# Patient Record
Sex: FEMALE | Race: WHITE | NOT HISPANIC OR LATINO | Employment: OTHER | URBAN - NONMETROPOLITAN AREA
[De-identification: names, ages, dates, MRNs, and addresses within clinical notes are randomized per-mention and may not be internally consistent; named-entity substitution may affect disease eponyms.]

---

## 2017-04-26 ENCOUNTER — APPOINTMENT (OUTPATIENT)
Dept: LAB | Facility: HOSPITAL | Age: 42
End: 2017-04-26

## 2017-04-26 ENCOUNTER — TRANSCRIBE ORDERS (OUTPATIENT)
Dept: ADMINISTRATIVE | Facility: HOSPITAL | Age: 42
End: 2017-04-26

## 2017-04-26 DIAGNOSIS — Z32.00 PREGNANCY EXAMINATION OR TEST, PREGNANCY UNCONFIRMED: Primary | ICD-10-CM

## 2017-04-26 LAB — HCG INTACT+B SERPL-ACNC: <2.39 MIU/ML

## 2017-04-26 PROCEDURE — 84702 CHORIONIC GONADOTROPIN TEST: CPT | Performed by: SPECIALIST

## 2017-04-26 PROCEDURE — 36415 COLL VENOUS BLD VENIPUNCTURE: CPT | Performed by: SPECIALIST

## 2017-07-15 ENCOUNTER — APPOINTMENT (OUTPATIENT)
Dept: LAB | Facility: HOSPITAL | Age: 42
End: 2017-07-15

## 2017-07-15 ENCOUNTER — TRANSCRIBE ORDERS (OUTPATIENT)
Dept: ADMINISTRATIVE | Facility: HOSPITAL | Age: 42
End: 2017-07-15

## 2017-07-15 DIAGNOSIS — Z32.00 ENCOUNTER FOR PREGNANCY TEST, RESULT UNKNOWN: Primary | ICD-10-CM

## 2017-07-15 LAB — HCG INTACT+B SERPL-ACNC: NORMAL MIU/ML

## 2017-07-15 PROCEDURE — 36415 COLL VENOUS BLD VENIPUNCTURE: CPT | Performed by: SPECIALIST

## 2017-07-15 PROCEDURE — 84702 CHORIONIC GONADOTROPIN TEST: CPT | Performed by: SPECIALIST

## 2017-08-15 ENCOUNTER — TRANSCRIBE ORDERS (OUTPATIENT)
Dept: OBSTETRICS AND GYNECOLOGY | Facility: HOSPITAL | Age: 42
End: 2017-08-15

## 2017-08-15 DIAGNOSIS — Z36.82 ENCOUNTER FOR NUCHAL TRANSLUCENCY TESTING: ICD-10-CM

## 2017-08-15 DIAGNOSIS — O09.522 AMA (ADVANCED MATERNAL AGE) MULTIGRAVIDA 35+, SECOND TRIMESTER: Primary | ICD-10-CM

## 2017-08-15 LAB
EXTERNAL ABO GROUPING: NORMAL
EXTERNAL ANTIBODY SCREEN: NEGATIVE
EXTERNAL HEPATITIS B SURFACE ANTIGEN: NEGATIVE
EXTERNAL RH FACTOR: NEGATIVE
EXTERNAL RUBELLA QUALITATIVE: NORMAL
HIV1 P24 AG SERPL QL IA: NORMAL

## 2017-08-29 ENCOUNTER — OFFICE VISIT (OUTPATIENT)
Dept: OBSTETRICS AND GYNECOLOGY | Facility: HOSPITAL | Age: 42
End: 2017-08-29

## 2017-08-29 ENCOUNTER — HOSPITAL ENCOUNTER (OUTPATIENT)
Dept: WOMENS IMAGING | Facility: HOSPITAL | Age: 42
Discharge: HOME | End: 2017-08-29
Attending: OBSTETRICS & GYNECOLOGY | Admitting: OBSTETRICS & GYNECOLOGY

## 2017-08-29 VITALS
HEIGHT: 63 IN | SYSTOLIC BLOOD PRESSURE: 110 MMHG | DIASTOLIC BLOOD PRESSURE: 78 MMHG | WEIGHT: 156.2 LBS | BODY MASS INDEX: 27.68 KG/M2

## 2017-08-29 DIAGNOSIS — Z36.82 ENCOUNTER FOR NUCHAL TRANSLUCENCY TESTING: ICD-10-CM

## 2017-08-29 DIAGNOSIS — O26.21 RECURRENT PREGNANCY LOSS IN PREGNANT PATIENT IN FIRST TRIMESTER, ANTEPARTUM: ICD-10-CM

## 2017-08-29 DIAGNOSIS — O09.511 AMA (ADVANCED MATERNAL AGE) PRIMIGRAVIDA 35+, FIRST TRIMESTER: Primary | ICD-10-CM

## 2017-08-29 DIAGNOSIS — O09.522 AMA (ADVANCED MATERNAL AGE) MULTIGRAVIDA 35+, SECOND TRIMESTER: ICD-10-CM

## 2017-08-29 PROBLEM — O09.519 AMA (ADVANCED MATERNAL AGE) PRIMIGRAVIDA 35+: Status: ACTIVE | Noted: 2017-08-29

## 2017-08-29 PROCEDURE — 99241 PR OFFICE CONSULTATION NEW/ESTAB PATIENT 15 MIN: CPT | Performed by: OBSTETRICS & GYNECOLOGY

## 2017-08-29 PROCEDURE — 76801 OB US < 14 WKS SINGLE FETUS: CPT | Performed by: OBSTETRICS & GYNECOLOGY

## 2017-08-29 PROCEDURE — 76813 OB US NUCHAL MEAS 1 GEST: CPT

## 2017-08-29 PROCEDURE — 76801 OB US < 14 WKS SINGLE FETUS: CPT

## 2017-08-29 PROCEDURE — 76813 OB US NUCHAL MEAS 1 GEST: CPT | Performed by: OBSTETRICS & GYNECOLOGY

## 2017-08-29 RX ORDER — PRENATAL WITH FERROUS FUM AND FOLIC ACID 3080; 920; 120; 400; 22; 1.84; 3; 20; 10; 1; 12; 200; 27; 25; 2 [IU]/1; [IU]/1; MG/1; [IU]/1; MG/1; MG/1; MG/1; MG/1; MG/1; MG/1; UG/1; MG/1; MG/1; MG/1; MG/1
1 TABLET ORAL DAILY
COMMUNITY
End: 2022-11-22

## 2017-08-29 NOTE — PROGRESS NOTES
Denies problems. Reports 2 spontaneous conceptions that resulted in miscarriage then saw Dr. Cardoso for unexplained infertility. Did multiple IUI cycles and one IVF cycle that did not result in conception. Current pregnancy was spontaneous conception; used Progesterone through 1st trimester. States Dr. Nunez has mentioned different options for genetic screening/testing; patient is interested but needs more information before making decision. To see Dr. Nunez today.

## 2017-08-29 NOTE — PROGRESS NOTES
Documentation of the ultasound findings, images, and interpretations as well as consultation note will be available in the patient's Viewpoint report located in the Chart Review Imaging tab in [facility].

## 2017-12-19 LAB — EXTERNAL GLUCOSE TOLERANCE TEST FASTING: 100 MG/DL

## 2018-02-13 ENCOUNTER — TRANSCRIBE ORDERS (OUTPATIENT)
Dept: LAB | Facility: HOSPITAL | Age: 43
End: 2018-02-13

## 2018-02-13 ENCOUNTER — LAB (OUTPATIENT)
Dept: LAB | Facility: HOSPITAL | Age: 43
End: 2018-02-13

## 2018-02-13 DIAGNOSIS — Z34.83 PRENATAL CARE, SUBSEQUENT PREGNANCY, THIRD TRIMESTER: Primary | ICD-10-CM

## 2018-02-13 DIAGNOSIS — Z34.83 PRENATAL CARE, SUBSEQUENT PREGNANCY, THIRD TRIMESTER: ICD-10-CM

## 2018-02-13 LAB — EXTERNAL GROUP B STREP ANTIGEN: NORMAL

## 2018-02-13 PROCEDURE — 87081 CULTURE SCREEN ONLY: CPT

## 2018-02-16 LAB — BACTERIA SPEC AEROBE CULT: NORMAL

## 2018-03-02 ENCOUNTER — PREP FOR SURGERY (OUTPATIENT)
Dept: OTHER | Facility: HOSPITAL | Age: 43
End: 2018-03-02

## 2018-03-02 RX ORDER — MISOPROSTOL 100 MCG
25 TABLET ORAL ONCE
Status: CANCELLED | OUTPATIENT
Start: 2018-03-04

## 2018-03-02 RX ORDER — METHYLERGONOVINE MALEATE 0.2 MG/ML
200 INJECTION INTRAVENOUS ONCE AS NEEDED
Status: CANCELLED | OUTPATIENT
Start: 2018-03-02

## 2018-03-02 RX ORDER — OXYTOCIN/RINGER'S LACTATE 20/1000 ML
999 PLASTIC BAG, INJECTION (ML) INTRAVENOUS ONCE
Status: CANCELLED | OUTPATIENT
Start: 2018-03-02 | End: 2018-03-02

## 2018-03-02 RX ORDER — SODIUM CHLORIDE 0.9 % (FLUSH) 0.9 %
1-10 SYRINGE (ML) INJECTION AS NEEDED
Status: CANCELLED | OUTPATIENT
Start: 2018-03-02

## 2018-03-02 RX ORDER — LIDOCAINE HYDROCHLORIDE 10 MG/ML
5 INJECTION, SOLUTION EPIDURAL; INFILTRATION; INTRACAUDAL; PERINEURAL AS NEEDED
Status: CANCELLED | OUTPATIENT
Start: 2018-03-02

## 2018-03-02 RX ORDER — MISOPROSTOL 200 UG/1
800 TABLET ORAL AS NEEDED
Status: CANCELLED | OUTPATIENT
Start: 2018-03-02

## 2018-03-02 RX ORDER — OXYTOCIN/RINGER'S LACTATE 20/1000 ML
125 PLASTIC BAG, INJECTION (ML) INTRAVENOUS CONTINUOUS PRN
Status: CANCELLED | OUTPATIENT
Start: 2018-03-02

## 2018-03-02 RX ORDER — OXYTOCIN-SODIUM CHLORIDE 0.9% IV SOLN 30 UNIT/500ML 30-0.9/5 UT/ML-%
2-24 SOLUTION INTRAVENOUS
Status: CANCELLED | OUTPATIENT
Start: 2018-03-04

## 2018-03-02 RX ORDER — CARBOPROST TROMETHAMINE 250 UG/ML
250 INJECTION, SOLUTION INTRAMUSCULAR AS NEEDED
Status: CANCELLED | OUTPATIENT
Start: 2018-03-02

## 2018-03-02 RX ORDER — SODIUM CHLORIDE, SODIUM LACTATE, POTASSIUM CHLORIDE, CALCIUM CHLORIDE 600; 310; 30; 20 MG/100ML; MG/100ML; MG/100ML; MG/100ML
125 INJECTION, SOLUTION INTRAVENOUS CONTINUOUS
Status: CANCELLED | OUTPATIENT
Start: 2018-03-02

## 2018-03-02 RX ORDER — MAGNESIUM CARB/ALUMINUM HYDROX 105-160MG
30 TABLET,CHEWABLE ORAL ONCE
Status: CANCELLED | OUTPATIENT
Start: 2018-03-02 | End: 2018-03-02

## 2018-03-04 ENCOUNTER — HOSPITAL ENCOUNTER (INPATIENT)
Dept: LABOR AND DELIVERY | Facility: HOSPITAL | Age: 43
LOS: 3 days | Discharge: HOME | End: 2018-03-07
Attending: OBSTETRICS & GYNECOLOGY | Admitting: OBSTETRICS & GYNECOLOGY

## 2018-03-04 PROBLEM — Z34.90 PREGNANCY: Status: ACTIVE | Noted: 2018-03-04

## 2018-03-04 LAB
ABO GROUP BLD: NORMAL
AMPHET+METHAMPHET UR QL: NEGATIVE
AMPHETAMINES UR QL: NEGATIVE
ANTI-D, PASSIVE: NORMAL
BARBITURATES UR QL SCN: NEGATIVE
BASOPHILS # BLD AUTO: 0.02 10*3/MM3 (ref 0–0.2)
BASOPHILS NFR BLD AUTO: 0.2 % (ref 0–1)
BENZODIAZ UR QL SCN: NEGATIVE
BLD GP AB SCN SERPL QL: POSITIVE
BUPRENORPHINE SERPL-MCNC: NEGATIVE NG/ML
CANNABINOIDS SERPL QL: NEGATIVE
COCAINE UR QL: NEGATIVE
DEPRECATED RDW RBC AUTO: 43.6 FL (ref 37–54)
EOSINOPHIL # BLD AUTO: 0.13 10*3/MM3 (ref 0–0.3)
EOSINOPHIL NFR BLD AUTO: 1.1 % (ref 0–3)
ERYTHROCYTE [DISTWIDTH] IN BLOOD BY AUTOMATED COUNT: 13.2 % (ref 11.3–14.5)
HCT VFR BLD AUTO: 37.5 % (ref 34.5–44)
HGB BLD-MCNC: 12.8 G/DL (ref 11.5–15.5)
IMM GRANULOCYTES # BLD: 0.06 10*3/MM3 (ref 0–0.03)
IMM GRANULOCYTES NFR BLD: 0.5 % (ref 0–0.6)
LYMPHOCYTES # BLD AUTO: 2.9 10*3/MM3 (ref 0.6–4.8)
LYMPHOCYTES NFR BLD AUTO: 24.9 % (ref 24–44)
MCH RBC QN AUTO: 31.2 PG (ref 27–31)
MCHC RBC AUTO-ENTMCNC: 34.1 G/DL (ref 32–36)
MCV RBC AUTO: 91.5 FL (ref 80–99)
METHADONE UR QL SCN: NEGATIVE
MONOCYTES # BLD AUTO: 0.73 10*3/MM3 (ref 0–1)
MONOCYTES NFR BLD AUTO: 6.3 % (ref 0–12)
NEUTROPHILS # BLD AUTO: 7.79 10*3/MM3 (ref 1.5–8.3)
NEUTROPHILS NFR BLD AUTO: 67 % (ref 41–71)
OPIATES UR QL: NEGATIVE
OXYCODONE UR QL SCN: NEGATIVE
PCP UR QL SCN: NEGATIVE
PLATELET # BLD AUTO: 236 10*3/MM3 (ref 150–450)
PMV BLD AUTO: 10.2 FL (ref 6–12)
PROPOXYPH UR QL: NEGATIVE
RBC # BLD AUTO: 4.1 10*6/MM3 (ref 3.89–5.14)
RH BLD: NEGATIVE
TRICYCLICS UR QL SCN: NEGATIVE
WBC NRBC COR # BLD: 11.63 10*3/MM3 (ref 3.5–10.8)

## 2018-03-04 PROCEDURE — 59200 INSERT CERVICAL DILATOR: CPT | Performed by: OBSTETRICS & GYNECOLOGY

## 2018-03-04 PROCEDURE — 85025 COMPLETE CBC W/AUTO DIFF WBC: CPT | Performed by: OBSTETRICS & GYNECOLOGY

## 2018-03-04 PROCEDURE — 25010000002 BUTORPHANOL PER 1 MG: Performed by: OBSTETRICS & GYNECOLOGY

## 2018-03-04 PROCEDURE — 86900 BLOOD TYPING SEROLOGIC ABO: CPT | Performed by: OBSTETRICS & GYNECOLOGY

## 2018-03-04 PROCEDURE — 86850 RBC ANTIBODY SCREEN: CPT | Performed by: OBSTETRICS & GYNECOLOGY

## 2018-03-04 PROCEDURE — C1755 CATHETER, INTRASPINAL: HCPCS

## 2018-03-04 PROCEDURE — 86870 RBC ANTIBODY IDENTIFICATION: CPT | Performed by: OBSTETRICS & GYNECOLOGY

## 2018-03-04 PROCEDURE — 59025 FETAL NON-STRESS TEST: CPT

## 2018-03-04 PROCEDURE — 80306 DRUG TEST PRSMV INSTRMNT: CPT | Performed by: OBSTETRICS & GYNECOLOGY

## 2018-03-04 PROCEDURE — 86901 BLOOD TYPING SEROLOGIC RH(D): CPT | Performed by: OBSTETRICS & GYNECOLOGY

## 2018-03-04 RX ORDER — MISOPROSTOL 100 MCG
25 TABLET ORAL ONCE
Status: COMPLETED | OUTPATIENT
Start: 2018-03-04 | End: 2018-03-04

## 2018-03-04 RX ORDER — DOCUSATE SODIUM 100 MG/1
100 CAPSULE, LIQUID FILLED ORAL DAILY
COMMUNITY

## 2018-03-04 RX ORDER — MAGNESIUM CARB/ALUMINUM HYDROX 105-160MG
30 TABLET,CHEWABLE ORAL ONCE
Status: DISCONTINUED | OUTPATIENT
Start: 2018-03-04 | End: 2018-03-05 | Stop reason: HOSPADM

## 2018-03-04 RX ORDER — SODIUM CHLORIDE, SODIUM LACTATE, POTASSIUM CHLORIDE, CALCIUM CHLORIDE 600; 310; 30; 20 MG/100ML; MG/100ML; MG/100ML; MG/100ML
125 INJECTION, SOLUTION INTRAVENOUS CONTINUOUS
Status: DISCONTINUED | OUTPATIENT
Start: 2018-03-04 | End: 2018-03-07 | Stop reason: HOSPADM

## 2018-03-04 RX ORDER — SODIUM CHLORIDE 0.9 % (FLUSH) 0.9 %
1-10 SYRINGE (ML) INJECTION AS NEEDED
Status: DISCONTINUED | OUTPATIENT
Start: 2018-03-04 | End: 2018-03-05 | Stop reason: HOSPADM

## 2018-03-04 RX ORDER — LIDOCAINE HYDROCHLORIDE 10 MG/ML
5 INJECTION, SOLUTION EPIDURAL; INFILTRATION; INTRACAUDAL; PERINEURAL AS NEEDED
Status: DISCONTINUED | OUTPATIENT
Start: 2018-03-04 | End: 2018-03-05 | Stop reason: HOSPADM

## 2018-03-04 RX ORDER — OXYTOCIN-SODIUM CHLORIDE 0.9% IV SOLN 30 UNIT/500ML 30-0.9/5 UT/ML-%
2-24 SOLUTION INTRAVENOUS
Status: DISCONTINUED | OUTPATIENT
Start: 2018-03-04 | End: 2018-03-05 | Stop reason: HOSPADM

## 2018-03-04 RX ADMIN — SODIUM CHLORIDE, POTASSIUM CHLORIDE, SODIUM LACTATE AND CALCIUM CHLORIDE 125 ML/HR: 600; 310; 30; 20 INJECTION, SOLUTION INTRAVENOUS at 19:00

## 2018-03-04 RX ADMIN — MISOPROSTOL 25 MCG: 100 TABLET ORAL at 18:45

## 2018-03-04 RX ADMIN — BUTORPHANOL TARTRATE 2 MG: 2 INJECTION, SOLUTION INTRAMUSCULAR; INTRAVENOUS at 23:57

## 2018-03-05 ENCOUNTER — ANESTHESIA (OUTPATIENT)
Dept: LABOR AND DELIVERY | Facility: HOSPITAL | Age: 43
End: 2018-03-05

## 2018-03-05 ENCOUNTER — ANESTHESIA EVENT (OUTPATIENT)
Dept: LABOR AND DELIVERY | Facility: HOSPITAL | Age: 43
End: 2018-03-05

## 2018-03-05 PROBLEM — Z34.90 PREGNANCY: Status: RESOLVED | Noted: 2018-03-04 | Resolved: 2018-03-05

## 2018-03-05 LAB
ABO GROUP BLD: NORMAL
FETAL BLEED: NEGATIVE
GLUCOSE BLDC GLUCOMTR-MCNC: 71 MG/DL (ref 70–130)
NUMBER OF DOSES: NORMAL
RH BLD: NEGATIVE

## 2018-03-05 PROCEDURE — 25010000002 ONDANSETRON PER 1 MG: Performed by: ANESTHESIOLOGY

## 2018-03-05 PROCEDURE — 86900 BLOOD TYPING SEROLOGIC ABO: CPT | Performed by: OBSTETRICS & GYNECOLOGY

## 2018-03-05 PROCEDURE — C1755 CATHETER, INTRASPINAL: HCPCS | Performed by: ANESTHESIOLOGY

## 2018-03-05 PROCEDURE — 82962 GLUCOSE BLOOD TEST: CPT

## 2018-03-05 PROCEDURE — 86901 BLOOD TYPING SEROLOGIC RH(D): CPT | Performed by: OBSTETRICS & GYNECOLOGY

## 2018-03-05 PROCEDURE — 85461 HEMOGLOBIN FETAL: CPT | Performed by: OBSTETRICS & GYNECOLOGY

## 2018-03-05 PROCEDURE — 59025 FETAL NON-STRESS TEST: CPT

## 2018-03-05 PROCEDURE — 25010000002 FENTANYL CITRATE (PF) 100 MCG/2ML SOLUTION: Performed by: ANESTHESIOLOGY

## 2018-03-05 PROCEDURE — 25010000002 ROPIVACAINE PER 1 MG: Performed by: ANESTHESIOLOGY

## 2018-03-05 PROCEDURE — 25010000002 RHO D IMMUNE GLOBULIN 1500 UNIT/2ML SOLUTION PREFILLED SYRINGE: Performed by: OBSTETRICS & GYNECOLOGY

## 2018-03-05 RX ORDER — LANOLIN 100 %
OINTMENT (GRAM) TOPICAL
Status: DISCONTINUED | OUTPATIENT
Start: 2018-03-05 | End: 2018-03-07 | Stop reason: HOSPADM

## 2018-03-05 RX ORDER — METOCLOPRAMIDE HYDROCHLORIDE 5 MG/ML
10 INJECTION INTRAMUSCULAR; INTRAVENOUS ONCE AS NEEDED
Status: DISCONTINUED | OUTPATIENT
Start: 2018-03-05 | End: 2018-03-05 | Stop reason: HOSPADM

## 2018-03-05 RX ORDER — DIPHENHYDRAMINE HYDROCHLORIDE 50 MG/ML
12.5 INJECTION INTRAMUSCULAR; INTRAVENOUS EVERY 8 HOURS PRN
Status: DISCONTINUED | OUTPATIENT
Start: 2018-03-05 | End: 2018-03-05 | Stop reason: HOSPADM

## 2018-03-05 RX ORDER — PROMETHAZINE HYDROCHLORIDE 25 MG/ML
12.5 INJECTION, SOLUTION INTRAMUSCULAR; INTRAVENOUS EVERY 6 HOURS PRN
Status: DISCONTINUED | OUTPATIENT
Start: 2018-03-05 | End: 2018-03-07 | Stop reason: HOSPADM

## 2018-03-05 RX ORDER — LIDOCAINE HYDROCHLORIDE AND EPINEPHRINE 15; 5 MG/ML; UG/ML
INJECTION, SOLUTION EPIDURAL AS NEEDED
Status: DISCONTINUED | OUTPATIENT
Start: 2018-03-05 | End: 2018-03-05 | Stop reason: SURG

## 2018-03-05 RX ORDER — OXYTOCIN/RINGER'S LACTATE 20/1000 ML
999 PLASTIC BAG, INJECTION (ML) INTRAVENOUS ONCE
Status: COMPLETED | OUTPATIENT
Start: 2018-03-05 | End: 2018-03-05

## 2018-03-05 RX ORDER — DOCUSATE SODIUM 100 MG/1
100 CAPSULE, LIQUID FILLED ORAL 2 TIMES DAILY
Status: DISCONTINUED | OUTPATIENT
Start: 2018-03-05 | End: 2018-03-07 | Stop reason: HOSPADM

## 2018-03-05 RX ORDER — PROMETHAZINE HYDROCHLORIDE 12.5 MG/1
12.5 SUPPOSITORY RECTAL EVERY 6 HOURS PRN
Status: DISCONTINUED | OUTPATIENT
Start: 2018-03-05 | End: 2018-03-07 | Stop reason: HOSPADM

## 2018-03-05 RX ORDER — CARBOPROST TROMETHAMINE 250 UG/ML
250 INJECTION, SOLUTION INTRAMUSCULAR AS NEEDED
Status: DISCONTINUED | OUTPATIENT
Start: 2018-03-05 | End: 2018-03-05 | Stop reason: HOSPADM

## 2018-03-05 RX ORDER — ZOLPIDEM TARTRATE 5 MG/1
5 TABLET ORAL NIGHTLY PRN
Status: DISCONTINUED | OUTPATIENT
Start: 2018-03-05 | End: 2018-03-07 | Stop reason: HOSPADM

## 2018-03-05 RX ORDER — FENTANYL CITRATE 50 UG/ML
INJECTION, SOLUTION INTRAMUSCULAR; INTRAVENOUS AS NEEDED
Status: DISCONTINUED | OUTPATIENT
Start: 2018-03-05 | End: 2018-03-05 | Stop reason: SURG

## 2018-03-05 RX ORDER — HYDROCODONE BITARTRATE AND ACETAMINOPHEN 5; 325 MG/1; MG/1
1 TABLET ORAL EVERY 4 HOURS PRN
Status: DISCONTINUED | OUTPATIENT
Start: 2018-03-05 | End: 2018-03-07 | Stop reason: HOSPADM

## 2018-03-05 RX ORDER — METHYLERGONOVINE MALEATE 0.2 MG/ML
200 INJECTION INTRAVENOUS ONCE AS NEEDED
Status: DISCONTINUED | OUTPATIENT
Start: 2018-03-05 | End: 2018-03-05 | Stop reason: HOSPADM

## 2018-03-05 RX ORDER — OXYCODONE HYDROCHLORIDE AND ACETAMINOPHEN 5; 325 MG/1; MG/1
1 TABLET ORAL EVERY 4 HOURS PRN
Status: DISCONTINUED | OUTPATIENT
Start: 2018-03-05 | End: 2018-03-07 | Stop reason: HOSPADM

## 2018-03-05 RX ORDER — IBUPROFEN 600 MG/1
600 TABLET ORAL EVERY 6 HOURS PRN
Status: DISCONTINUED | OUTPATIENT
Start: 2018-03-05 | End: 2018-03-05 | Stop reason: SDUPTHER

## 2018-03-05 RX ORDER — LANOLIN 100 %
OINTMENT (GRAM) TOPICAL AS NEEDED
Status: DISCONTINUED | OUTPATIENT
Start: 2018-03-05 | End: 2018-03-07 | Stop reason: HOSPADM

## 2018-03-05 RX ORDER — ROPIVACAINE HYDROCHLORIDE 2 MG/ML
15 INJECTION, SOLUTION EPIDURAL; INFILTRATION; PERINEURAL CONTINUOUS
Status: DISCONTINUED | OUTPATIENT
Start: 2018-03-05 | End: 2018-03-07 | Stop reason: HOSPADM

## 2018-03-05 RX ORDER — OXYTOCIN/RINGER'S LACTATE 20/1000 ML
125 PLASTIC BAG, INJECTION (ML) INTRAVENOUS CONTINUOUS PRN
Status: DISCONTINUED | OUTPATIENT
Start: 2018-03-05 | End: 2018-03-05 | Stop reason: HOSPADM

## 2018-03-05 RX ORDER — IBUPROFEN 600 MG/1
600 TABLET ORAL EVERY 6 HOURS PRN
Status: DISCONTINUED | OUTPATIENT
Start: 2018-03-05 | End: 2018-03-07 | Stop reason: HOSPADM

## 2018-03-05 RX ORDER — MISOPROSTOL 200 UG/1
800 TABLET ORAL AS NEEDED
Status: DISCONTINUED | OUTPATIENT
Start: 2018-03-05 | End: 2018-03-05 | Stop reason: HOSPADM

## 2018-03-05 RX ORDER — TRISODIUM CITRATE DIHYDRATE AND CITRIC ACID MONOHYDRATE 500; 334 MG/5ML; MG/5ML
30 SOLUTION ORAL ONCE
Status: DISCONTINUED | OUTPATIENT
Start: 2018-03-05 | End: 2018-03-05 | Stop reason: HOSPADM

## 2018-03-05 RX ORDER — ONDANSETRON 2 MG/ML
4 INJECTION INTRAMUSCULAR; INTRAVENOUS ONCE AS NEEDED
Status: COMPLETED | OUTPATIENT
Start: 2018-03-05 | End: 2018-03-05

## 2018-03-05 RX ORDER — EPHEDRINE SULFATE 50 MG/ML
10 INJECTION, SOLUTION INTRAVENOUS
Status: DISCONTINUED | OUTPATIENT
Start: 2018-03-05 | End: 2018-03-05 | Stop reason: HOSPADM

## 2018-03-05 RX ORDER — PROMETHAZINE HYDROCHLORIDE 25 MG/1
25 TABLET ORAL EVERY 6 HOURS PRN
Status: DISCONTINUED | OUTPATIENT
Start: 2018-03-05 | End: 2018-03-07 | Stop reason: HOSPADM

## 2018-03-05 RX ORDER — BISACODYL 10 MG
10 SUPPOSITORY, RECTAL RECTAL DAILY PRN
Status: DISCONTINUED | OUTPATIENT
Start: 2018-03-06 | End: 2018-03-07 | Stop reason: HOSPADM

## 2018-03-05 RX ORDER — DOCUSATE SODIUM 100 MG/1
100 CAPSULE, LIQUID FILLED ORAL 2 TIMES DAILY
Status: DISCONTINUED | OUTPATIENT
Start: 2018-03-05 | End: 2018-03-05 | Stop reason: SDUPTHER

## 2018-03-05 RX ORDER — SODIUM CHLORIDE 0.9 % (FLUSH) 0.9 %
1-10 SYRINGE (ML) INJECTION AS NEEDED
Status: DISCONTINUED | OUTPATIENT
Start: 2018-03-05 | End: 2018-03-07 | Stop reason: HOSPADM

## 2018-03-05 RX ORDER — LIDOCAINE HYDROCHLORIDE AND EPINEPHRINE 20; 5 MG/ML; UG/ML
INJECTION, SOLUTION EPIDURAL; INFILTRATION; INTRACAUDAL; PERINEURAL AS NEEDED
Status: DISCONTINUED | OUTPATIENT
Start: 2018-03-05 | End: 2018-03-05 | Stop reason: SURG

## 2018-03-05 RX ADMIN — SODIUM CHLORIDE, POTASSIUM CHLORIDE, SODIUM LACTATE AND CALCIUM CHLORIDE 125 ML/HR: 600; 310; 30; 20 INJECTION, SOLUTION INTRAVENOUS at 06:55

## 2018-03-05 RX ADMIN — SODIUM CHLORIDE, POTASSIUM CHLORIDE, SODIUM LACTATE AND CALCIUM CHLORIDE 125 ML/HR: 600; 310; 30; 20 INJECTION, SOLUTION INTRAVENOUS at 01:09

## 2018-03-05 RX ADMIN — SODIUM CHLORIDE, POTASSIUM CHLORIDE, SODIUM LACTATE AND CALCIUM CHLORIDE 1500 ML: 600; 310; 30; 20 INJECTION, SOLUTION INTRAVENOUS at 04:09

## 2018-03-05 RX ADMIN — Medication 125 ML/HR: at 12:59

## 2018-03-05 RX ADMIN — ROPIVACAINE HYDROCHLORIDE 15 ML/HR: 2 INJECTION, SOLUTION EPIDURAL; INFILTRATION at 04:33

## 2018-03-05 RX ADMIN — Medication 999 ML/HR: at 12:49

## 2018-03-05 RX ADMIN — OXYTOCIN 2 MILLI-UNITS/MIN: 10 INJECTION INTRAVENOUS at 01:09

## 2018-03-05 RX ADMIN — LIDOCAINE HYDROCHLORIDE,EPINEPHRINE BITARTRATE 5 ML: 20; .005 INJECTION, SOLUTION EPIDURAL; INFILTRATION; INTRACAUDAL; PERINEURAL at 12:29

## 2018-03-05 RX ADMIN — OXYCODONE AND ACETAMINOPHEN 1 TABLET: 5; 325 TABLET ORAL at 15:01

## 2018-03-05 RX ADMIN — IBUPROFEN 600 MG: 600 TABLET ORAL at 15:02

## 2018-03-05 RX ADMIN — ONDANSETRON HYDROCHLORIDE 4 MG: 2 INJECTION, SOLUTION INTRAMUSCULAR; INTRAVENOUS at 07:59

## 2018-03-05 RX ADMIN — LIDOCAINE HYDROCHLORIDE AND EPINEPHRINE 2 ML: 15; 5 INJECTION, SOLUTION EPIDURAL at 04:27

## 2018-03-05 RX ADMIN — SODIUM CHLORIDE, POTASSIUM CHLORIDE, SODIUM LACTATE AND CALCIUM CHLORIDE 125 ML/HR: 600; 310; 30; 20 INJECTION, SOLUTION INTRAVENOUS at 04:15

## 2018-03-05 RX ADMIN — FENTANYL CITRATE 100 MCG: 50 INJECTION, SOLUTION INTRAMUSCULAR; INTRAVENOUS at 04:32

## 2018-03-05 RX ADMIN — ROPIVACAINE HYDROCHLORIDE 11 ML: 5 INJECTION, SOLUTION EPIDURAL; INFILTRATION; PERINEURAL at 04:29

## 2018-03-05 RX ADMIN — HUMAN RHO(D) IMMUNE GLOBULIN 1500 UNITS: 1500 SOLUTION INTRAMUSCULAR; INTRAVENOUS at 21:28

## 2018-03-05 RX ADMIN — LIDOCAINE HYDROCHLORIDE AND EPINEPHRINE 3 ML: 15; 5 INJECTION, SOLUTION EPIDURAL at 04:25

## 2018-03-05 RX ADMIN — WITCH HAZEL 1 PAD: 500 SOLUTION RECTAL; TOPICAL at 15:01

## 2018-03-05 RX ADMIN — EPHEDRINE SULFATE 10 MG: 50 INJECTION, SOLUTION INTRAVENOUS at 07:38

## 2018-03-05 RX ADMIN — SODIUM CHLORIDE, POTASSIUM CHLORIDE, SODIUM LACTATE AND CALCIUM CHLORIDE 300 ML: 600; 310; 30; 20 INJECTION, SOLUTION INTRAVENOUS at 07:30

## 2018-03-05 NOTE — PLAN OF CARE
Problem: Patient Care Overview (Adult)  Goal: Plan of Care Review  Outcome: Ongoing (interventions implemented as appropriate)      Problem: Breastfeeding (Adult,NICU,[place],Obstetrics,Pediatric)  Goal: Signs and Symptoms of Listed Potential Problems Will be Absent or Manageable (Breastfeeding)  Outcome: Ongoing (interventions implemented as appropriate)

## 2018-03-05 NOTE — PROCEDURES
42 y.o.  OB History      Para Term  AB Living    3 0 0 0 2 0    SAB TAB Ectopic Multiple Live Births    1 0 0 0        Presents as an induction of labour due to AMA  Her primary OB requests a Humphrey Bulb placement to initiate the induction of labour.    Fetal Heart Rate Assessment   Method: Fetal HR Assessment Method: external   Beats/min: Fetal HR (Beats/Min): 130   Baseline: Fetal HR Baseline: normal range (110-160 bpm)   Varibility: Fetal HR Variability: moderate (amplitude range 6 to 25 bpm)   Accels: Fetal HR Accelerations: greater than/equal to 15 bpm, lasting at least 15 seconds   Decels: Fetal HR Decelerations: absent   Tracing Category:       TOCO:  Irregular  SVE:  1/80/-2    A Humphrey Bulb was placed without difficulties with 60 cc of sterile saline.  The patient tolerated the procedure well.

## 2018-03-05 NOTE — LACTATION NOTE
"This note was copied from a baby's chart.     03/05/18 4000   Maternal Information   Date of Referral 03/05/18   Person Making Referral other (see comments)  (courtesy)   Maternal Reason for Referral breastfeeding currently   Maternal Infant Assessment   Size Issue, Bilateral Breasts no   Shape, Bilateral Breasts round   Density, Bilateral Breasts soft   Nipples, Bilateral everted   Nipple Conditions, Bilateral intact   Infant Assessment   Sucking Reflex present   Rooting Reflex present   Swallow Reflex present   LATCH Score   Latch 2-->grasps breast, tongue down, lips flanged, rhythmic sucking   Audible Swallowing 1-->a few with stimulation   Type Of Nipple 2-->everted (after stimulation)   Comfort (Breast/Nipple) 2-->soft/nontender   Hold (Positioning) 1-->minimal assist, teach one side: mother does other, staff holds   Score (less than 7 for 2/more consecutive times, consult Lactation Consultant) 8   Maternal Infant Feeding   Maternal Emotional State relaxed   Previous Breastfeeding History no   Infant Positioning clutch/\"football\";cross-cradle   Signs of Milk Transfer audible swallow;infant jaw motion present   Presence of Pain no   Comfort Measures Before/During Feeding infant position adjusted;latch adjusted;maternal position adjusted   Nipple Shape After Feeding, Left Breast round;symmetrical;appropriately projected   Latch Assistance yes   Feeding Infant   Feeding Readiness Cues rooting   Effective Latch During Feeding yes   Audible Swallow yes   Suck/Swallow Coordination present   Skin-to-Skin Contact During Feeding yes   Equipment Type/Education   Breast Pump Type double electric, personal     "

## 2018-03-05 NOTE — L&D DELIVERY NOTE
3/5/2018    Patient:Yamel Don    MR#:[id number]    Vaginal Delivery Note  42 y.o. yo female  at 39w2d    Patient Active Problem List   Diagnosis   • AMA (advanced maternal age) primigravida 35+   • Recurrent pregnancy loss in pregnant patient in first trimester, antepartum   • Vaginal delivery       Delivery     Delivery: Vaginal, Spontaneous Delivery     YOB: 2016    Time of Birth: 12:39 PM      Anesthesia: Epidural     Delivering clinician: Vandana Nunez    Forceps?   No   Vacuum? No    Shoulder dystocia present: No          Infant    Findings: male  infant     Infant observations: Weight: 3180 g (7 lb 0.2 oz)     Observations/Comments:         Apgars: 7   @ 1 minute /    9   @ 5 minutes         Placenta, Cord, and Fluid    Placenta delivered  Spontaneous  at  3/5 12:43 PM     Cord: 3 vessels  present.   Nuchal Cord?  yes; Number of nuchal loops present:    1   Cord blood obtained: Yes    Cord gases obtained:  No              Repair    Episiotomy: Yes    Lacerations: No   Estimated Blood Loss: 400  mls.   Suture used for repair: 3-0 Vicryl      Complications  none    Disposition  Mother to Mother Baby/Postpartum  in stable condition currently.  Baby to remains with mom  in stable condition currently.      Cord blood banking completed              Vandana Nunez MD  18  1:07 PM

## 2018-03-05 NOTE — PLAN OF CARE
Problem: Patient Care Overview (Adult)  Goal: Adult Individualization and Mutuality  Outcome: Ongoing (interventions implemented as appropriate)   03/04/18 1930   Individualization   Patient Specific Preferences Pt's pain will be kept under control   Patient Specific Goals healthy mom and healthy baby   Patient Specific Interventions continuous EFM and TOCO, glover bulb at 2300 with low dose pit, pit per protocol 0500     Goal: Discharge Needs Assessment  Outcome: Ongoing (interventions implemented as appropriate)   03/04/18 1930   Discharge Needs Assessment   Concerns To Be Addressed denies needs/concerns at this time   Equipment Needed After Discharge none   Discharge Disposition still a patient   Current Health   Anticipated Changes Related to Illness none   Self-Care   Equipment Currently Used at Home none   Living Environment   Transportation Available car       Problem: Labor (Cervical Ripen, Induct, Augment) (Adult,Obstetrics,Pediatric)  Goal: Signs and Symptoms of Listed Potential Problems Will be Absent or Manageable (Labor)  Outcome: Ongoing (interventions implemented as appropriate)   03/04/18 1930   Labor (Cervical Ripen, Induct, Augment)   Problems Assessed (Labor) all   Problems Present (Labor) none

## 2018-03-05 NOTE — PLAN OF CARE
Problem: Patient Care Overview (Adult)  Goal: Plan of Care Review  Outcome: Ongoing (interventions implemented as appropriate)   03/04/18 1930   Coping/Psychosocial Response Interventions   Plan Of Care Reviewed With patient;spouse   Patient Care Overview   Progress progress toward functional goals as expected

## 2018-03-05 NOTE — ANESTHESIA PROCEDURE NOTES
Labor Epidural    Patient location during procedure: OB  Performed By  Anesthesiologist: ABEBA PERDOMO  Preanesthetic Checklist  Completed: patient identified, surgical consent, pre-op evaluation, timeout performed, IV checked, risks and benefits discussed and monitors and equipment checked  Prep:  Pt Position:sitting  Sterile Tech:cap, gloves, mask and sterile barrier  Prep:DuraPrep  Monitoring:blood pressure monitoring  Epidural Block Procedure:  Approach:midline  Guidance:palpation technique  Location:L3-L4  Needle Type:Tuohy  Needle Gauge:17 G  Loss of Resistance Medium: air  Loss of Resistance: 4cm  Cath Depth at skin:11 cm  Paresthesia: none  Aspiration:negative  Test Dose:negative  Number of Attempts: 1  Post Assessment:  Dressing:occlusive dressing applied and secured with tape  Pt Tolerance:patient tolerated the procedure well with no apparent complications  Complications:no

## 2018-03-05 NOTE — PLAN OF CARE
Problem: Patient Care Overview (Adult)  Goal: Plan of Care Review  Outcome: Ongoing (interventions implemented as appropriate)   03/05/18 1444   Coping/Psychosocial Response Interventions   Plan Of Care Reviewed With patient;spouse   Patient Care Overview   Progress progress toward functional goals as expected     Goal: Adult Individualization and Mutuality  Outcome: Ongoing (interventions implemented as appropriate)   03/05/18 1444   Individualization   Patient Specific Preferences healthy mom and healthy baby   Patient Specific Goals pain control   Patient Specific Interventions breastfeeding      Goal: Discharge Needs Assessment  Outcome: Ongoing (interventions implemented as appropriate)   03/05/18 1444   Discharge Needs Assessment   Concerns To Be Addressed no discharge needs identified   Readmission Within The Last 30 Days no previous admission in last 30 days   Equipment Needed After Discharge none   Current Health   Anticipated Changes Related to Illness none       Problem: Labor (Cervical Ripen, Induct, Augment) (Adult,Obstetrics,Pediatric)  Goal: Signs and Symptoms of Listed Potential Problems Will be Absent or Manageable (Labor)  Outcome: Ongoing (interventions implemented as appropriate)   03/05/18 1444   Labor (Cervical Ripen, Induct, Augment)   Problems Assessed (Labor) pain   Problems Present (Labor) pain

## 2018-03-05 NOTE — H&P
History and Physical:    Subjective     Chief Complaint   Patient presents with   • Scheduled Induction       Yamel Don is a 42 y.o. year old  with an Estimated Date of Delivery: 3/10/18 currently at 39w2d presenting with induction of labor for AMA. .    Prenatal care has been with Vandana Nunez MD.  It has been significant for No Complications.        Review of Systems  Pertinent items are noted in HPI.     Past Medical History:   Diagnosis Date   • Abnormal Pap smear of cervix    • History of HPV infection    • HPV (human papilloma virus) infection    • Infertility, female     unexplained   • Recurrent pregnancy loss, currently pregnant     miscarrigae x2     Past Surgical History:   Procedure Laterality Date   • ANKLE SURGERY Left     Achilles rupture repair   • DILATATION AND CURETTAGE  2014   • OTHER SURGICAL HISTORY  2017    Oocyte retrieval for IVF   • WISDOM TOOTH EXTRACTION       History reviewed. No pertinent family history.  Social History   Substance Use Topics   • Smoking status: Never Smoker   • Smokeless tobacco: Never Used   • Alcohol use No     Prescriptions Prior to Admission   Medication Sig Dispense Refill Last Dose   • docusate sodium (COLACE) 100 MG capsule Take 100 mg by mouth Daily.   3/4/2018 at 0900   • Omega-3 Fatty Acids (OMEGA 3 PO) Take 1 capsule by mouth Daily.   3/3/2018 at 2100   • Prenatal Vit-Fe Fumarate-FA (PRENATAL 27-1) 27-1 MG tablet tablet Take 1 tablet by mouth Daily.   3/3/2018 at 2100   • Probiotic Product (PROBIOTIC DAILY PO) Take 1 capsule by mouth Daily.   3/4/2018 at 0900     Allergies:  Penicillins  OB History    Para Term  AB Living   3 0 0 0 2 0   SAB TAB Ectopic Multiple Live Births   1 0 0 0       # Outcome Date GA Lbr Mauri/2nd Weight Sex Delivery Anes PTL Lv   3 Current            2 SAB 2014 6w0d             Birth Comments: no D&C   1 AB 2013 6w0d             Birth Comments: MAB with D&C            Objective     /58  " Pulse 88  Temp 97.9 °F (36.6 °C) (Oral)   Resp 18  Ht 160 cm (63\")  Wt 81.6 kg (180 lb)  LMP 06/03/2017 (Exact Date)  Breastfeeding? Yes  BMI 31.89 kg/m2    Physical Exam    General:  No acute distress           Abdomen: Gravid, nontender       FHT's: reactive    Cervix: Currently 9/90/-1   [place]: Contraction are irregular     Lab Review   External Prenatal Results         Pregnancy Outside Results - these were transcribed from office records.  See scanned records for details. Date Time   Hgb      Hct      ABO ^ O  08/15/17    Rh ^ Negative  08/15/17    Antibody Screen ^ Negative  08/15/17    Glucose Fasting GTT ^ 100 mg/dL 12/19/17    Glucose Tolerance Test 1 hour      Glucose Tolerance Test 3 hour      Gonorrhea (discrete)      Chlamydia (discrete)      RPR      VDRL      Syphillis Antibody      Rubella ^ Immune  08/15/17    HBsAg ^ Negative  08/15/17    Herpes Simplex Virus PCR      Herpes Simplex VIrus Culture      HIV ^ Non-Reactive  08/15/17    Hep C RNA Quant PCR      Hep C Antibody      Urine Drug Screen      AFP      Group B Strep ^ NEG  02/13/18    GBS Susceptibility to Clindamycin      GBS Susceptibility to Eythromycin      Fetal Fibronectin      Genetic Testing, Maternal Blood             Legend: ^: Historical              Assessment/Plan     ASSESSMENT  1. IUP at 39w2d  2. induction of labor   3. GBBSnegative  PLAN  1. Admit to labor and delivery   2.  pitocin and glover bulb         Vandana Nunez MD  3/5/2018@  "

## 2018-03-05 NOTE — ANESTHESIA PREPROCEDURE EVALUATION
Anesthesia Evaluation     Patient summary reviewed and Nursing notes reviewed   NPO Solid Status: > 8 hours  NPO Liquid Status: > 8 hours           Airway   Mallampati: II  TM distance: <3 FB  Neck ROM: full  Possible difficult intubation and Small opening  Dental      Pulmonary - negative pulmonary ROS   Cardiovascular - negative cardio ROS        Neuro/Psych- negative ROS  GI/Hepatic/Renal/Endo - negative ROS     Musculoskeletal (-) negative ROS    Abdominal    Substance History - negative use     OB/GYN    (+) Pregnant,         Other - negative ROS                       Anesthesia Plan    ASA 2     epidural     Anesthetic plan and risks discussed with patient and spouse/significant other.

## 2018-03-06 LAB
BASOPHILS # BLD AUTO: 0.02 10*3/MM3 (ref 0–0.2)
BASOPHILS NFR BLD AUTO: 0.1 % (ref 0–1)
DEPRECATED RDW RBC AUTO: 45.5 FL (ref 37–54)
EOSINOPHIL # BLD AUTO: 0.11 10*3/MM3 (ref 0–0.3)
EOSINOPHIL NFR BLD AUTO: 0.6 % (ref 0–3)
ERYTHROCYTE [DISTWIDTH] IN BLOOD BY AUTOMATED COUNT: 13.5 % (ref 11.3–14.5)
HCT VFR BLD AUTO: 28.3 % (ref 34.5–44)
HGB BLD-MCNC: 9.6 G/DL (ref 11.5–15.5)
IMM GRANULOCYTES # BLD: 0.07 10*3/MM3 (ref 0–0.03)
IMM GRANULOCYTES NFR BLD: 0.4 % (ref 0–0.6)
LYMPHOCYTES # BLD AUTO: 2.86 10*3/MM3 (ref 0.6–4.8)
LYMPHOCYTES NFR BLD AUTO: 16.5 % (ref 24–44)
MCH RBC QN AUTO: 31.6 PG (ref 27–31)
MCHC RBC AUTO-ENTMCNC: 33.9 G/DL (ref 32–36)
MCV RBC AUTO: 93.1 FL (ref 80–99)
MONOCYTES # BLD AUTO: 0.93 10*3/MM3 (ref 0–1)
MONOCYTES NFR BLD AUTO: 5.4 % (ref 0–12)
NEUTROPHILS # BLD AUTO: 13.37 10*3/MM3 (ref 1.5–8.3)
NEUTROPHILS NFR BLD AUTO: 77 % (ref 41–71)
PLATELET # BLD AUTO: 183 10*3/MM3 (ref 150–450)
PMV BLD AUTO: 9.4 FL (ref 6–12)
RBC # BLD AUTO: 3.04 10*6/MM3 (ref 3.89–5.14)
WBC NRBC COR # BLD: 17.36 10*3/MM3 (ref 3.5–10.8)

## 2018-03-06 PROCEDURE — 85025 COMPLETE CBC W/AUTO DIFF WBC: CPT | Performed by: OBSTETRICS & GYNECOLOGY

## 2018-03-06 RX ADMIN — OXYCODONE AND ACETAMINOPHEN 1 TABLET: 5; 325 TABLET ORAL at 08:31

## 2018-03-06 RX ADMIN — OXYCODONE AND ACETAMINOPHEN 1 TABLET: 5; 325 TABLET ORAL at 01:20

## 2018-03-06 RX ADMIN — IBUPROFEN 600 MG: 600 TABLET ORAL at 17:01

## 2018-03-06 RX ADMIN — IBUPROFEN 600 MG: 600 TABLET ORAL at 01:20

## 2018-03-06 RX ADMIN — IBUPROFEN 600 MG: 600 TABLET ORAL at 08:31

## 2018-03-06 RX ADMIN — DOCUSATE SODIUM 100 MG: 100 CAPSULE, LIQUID FILLED ORAL at 21:24

## 2018-03-06 RX ADMIN — OXYCODONE AND ACETAMINOPHEN 1 TABLET: 5; 325 TABLET ORAL at 13:48

## 2018-03-06 RX ADMIN — DOCUSATE SODIUM 100 MG: 100 CAPSULE, LIQUID FILLED ORAL at 08:30

## 2018-03-06 NOTE — LACTATION NOTE
This note was copied from a baby's chart.     03/06/18 1010   Maternal Information   Date of Referral 03/06/18   Person Making Referral other (see comments)  (courtesy)   Maternal Reason for Referral breastfeeding currently   Maternal Infant Assessment   Size Issue, Bilateral Breasts no   Shape, Bilateral Breasts round   Density, Bilateral Breasts soft   Nipples, Bilateral everted   Nipple Conditions, Bilateral blistered  (small blood blister on tip of both nipples)   Infant Assessment   Sucking Reflex present   Rooting Reflex present   Swallow Reflex present   LATCH Score   Latch 2-->grasps breast, tongue down, lips flanged, rhythmic sucking   Audible Swallowing 1-->a few with stimulation   Type Of Nipple 2-->everted (after stimulation)   Comfort (Breast/Nipple) 2-->soft/nontender   Hold (Positioning) 2-->no assist from staff, mother able to position/hold infant   Score (less than 7 for 2/more consecutive times, consult Lactation Consultant) 9   Maternal Infant Feeding   Infant Positioning cross-cradle   Presence of Pain no   Latch Assistance no   Feeding Infant   Feeding Readiness Cues rooting   Effective Latch During Feeding yes   Audible Swallow yes   Suck/Swallow Coordination present   Skin-to-Skin Contact During Feeding yes

## 2018-03-06 NOTE — PROGRESS NOTES
3/6/2018  PPD #1    Subjective   Yamel feels tired.  Patient describes her lochia less than menses.  Pain is well controlled       Objective   Temp: Temp:  [98 °F (36.7 °C)-100.3 °F (37.9 °C)] 98.5 °F (36.9 °C) Temp src: Oral   BP: BP: (107-154)/(61-87) 107/74        Pulse: Heart Rate:  [] 102  RR: Resp:  [16-18] 18    General:  No acute distress   Abdomen: Fundus firm and beneath umbilicus   Pelvis: deferred     Lab Results   Component Value Date    WBC 17.36 (H) 03/06/2018    HGB 9.6 (L) 03/06/2018    HCT 28.3 (L) 03/06/2018    MCV 93.1 03/06/2018     03/06/2018    HEPBSAG Negative 08/15/2017       Assessment  1. PPD# 1 after vaginal delivery    Plan  1. Routine postpartum care.      This note has been electronically signed.    Vandana Nunez MD  March 6, 2018

## 2018-03-06 NOTE — ANESTHESIA POSTPROCEDURE EVALUATION
Patient: Yamel Don    Procedure Summary     Date Anesthesia Start Anesthesia Stop Room / Location    03/05/18 0417 1243        Procedure Diagnosis Scheduled Providers Provider    LABOR ANALGESIA No diagnosis on file.  Ida Miller DO          Anesthesia Type: epidural  Last vitals  BP   111/70 (03/06/18 0800)   Temp   98.3 °F (36.8 °C) (03/06/18 0800)   Pulse   93 (03/06/18 0800)   Resp   16 (03/06/18 0800)     SpO2         Post Anesthesia Care and Evaluation    Patient location during evaluation: bedside  Patient participation: complete - patient participated  Level of consciousness: awake and alert  Pain management: adequate  Airway patency: patent  Anesthetic complications: No anesthetic complications    Cardiovascular status: acceptable  Respiratory status: acceptable  Hydration status: acceptable  Post Neuraxial Block status: Motor and sensory function returned to baseline and No signs or symptoms of PDPH

## 2018-03-07 VITALS
RESPIRATION RATE: 16 BRPM | WEIGHT: 180 LBS | HEART RATE: 89 BPM | HEIGHT: 63 IN | BODY MASS INDEX: 31.89 KG/M2 | SYSTOLIC BLOOD PRESSURE: 122 MMHG | DIASTOLIC BLOOD PRESSURE: 82 MMHG | TEMPERATURE: 97.8 F

## 2018-03-07 RX ORDER — OXYCODONE HYDROCHLORIDE AND ACETAMINOPHEN 5; 325 MG/1; MG/1
1 TABLET ORAL EVERY 4 HOURS PRN
Qty: 24 TABLET | Refills: 0 | Status: SHIPPED | OUTPATIENT
Start: 2018-03-07 | End: 2018-03-15

## 2018-03-07 RX ORDER — IBUPROFEN 600 MG/1
600 TABLET ORAL EVERY 6 HOURS PRN
Qty: 35 TABLET | Refills: 1 | Status: SHIPPED | OUTPATIENT
Start: 2018-03-07

## 2018-03-07 RX ADMIN — IBUPROFEN 600 MG: 600 TABLET ORAL at 08:57

## 2018-03-07 RX ADMIN — OXYCODONE AND ACETAMINOPHEN 1 TABLET: 5; 325 TABLET ORAL at 08:57

## 2018-03-07 RX ADMIN — DOCUSATE SODIUM 100 MG: 100 CAPSULE, LIQUID FILLED ORAL at 08:57

## 2018-03-07 NOTE — DISCHARGE SUMMARY
Discharge Summary    Date of Admission: 3/4/2018  Date of Discharge:  3/7/2018      Patient: Yamel Don      MR#:[id number]    Delivery Provider: Vandana Nunez     Discharge Surgeon/OB: Vandana Nunez    Presenting Problem/History of Present Illness  Pregnancy [Z34.90]     Patient Active Problem List   Diagnosis   • AMA (advanced maternal age) primigravida 35+   • Recurrent pregnancy loss in pregnant patient in first trimester, antepartum   • Vaginal delivery         Discharge Diagnosis: Vaginal delivery at 39w2d    Procedures:  Vaginal, Spontaneous Delivery     3/5/2018    12:39 PM        Discharge Date: 3/7/2018;     Hospital Course  Patient is a 42 y.o. female  at 39w2d status post vaginal delivery without complication. Postpartum the patient did well. She remained afebrile, with vital signs stable. She was ready for discharge on postpartum day 2.     Infant:   male  fetus 3180 g (7 lb 0.2 oz)  with Apgar scores of 7  , 9   at five minutes.    Condition on Discharge:  Stable    Vital Signs  Temp:  [97.8 °F (36.6 °C)-98.2 °F (36.8 °C)] 97.8 °F (36.6 °C)  Heart Rate:  [78-93] 89  Resp:  [16-18] 16  BP: (111-122)/(69-82) 122/82    Lab Results   Component Value Date    WBC 17.36 (H) 2018    HGB 9.6 (L) 2018    HCT 28.3 (L) 2018    MCV 93.1 2018     2018       Discharge Disposition  Home or Self Care    Discharge Medications   Yamel Don   Home Medication Instructions ABUNDIO:[id number]    Printed on:18 0854   Medication Information                      docusate sodium (COLACE) 100 MG capsule  Take 100 mg by mouth Daily.             ibuprofen (ADVIL,MOTRIN) 600 MG tablet  Take 1 tablet by mouth Every 6 (Six) Hours As Needed for Mild Pain .             Omega-3 Fatty Acids (OMEGA 3 PO)  Take 1 capsule by mouth Daily.             oxyCODONE-acetaminophen (PERCOCET) 5-325 MG per tablet  Take 1 tablet by mouth Every 4 (Four) Hours As Needed for Severe Pain  for  up to 8 days.             Prenatal Vit-Fe Fumarate-FA (PRENATAL 27-1) 27-1 MG tablet tablet  Take 1 tablet by mouth Daily.             Probiotic Product (PROBIOTIC DAILY PO)  Take 1 capsule by mouth Daily.                 Discharge Diet:     Activity at Discharge:   Activity Instructions     Pelvic Rest                     Follow-up Appointments  No future appointments.  Additional Instructions for the Follow-ups that You Need to Schedule     Call MD for problems / concerns.    As directed        Discharge Follow-up with Specialty: Vandana Nunez; 6 Weeks    As directed    Specialty:  Vandana Nunez    Follow Up:  6 Weeks                     Vandana Nunez MD  03/07/18  8:54 AM  [facility]

## 2018-03-07 NOTE — PLAN OF CARE
Problem: Patient Care Overview (Adult)  Goal: Adult Individualization and Mutuality  Outcome: Outcome(s) achieved Date Met: 18    Goal: Discharge Needs Assessment  Outcome: Outcome(s) achieved Date Met: 18 1216   Discharge Needs Assessment   Concerns To Be Addressed no discharge needs identified   Concerns Comments n/a   Readmission Within The Last 30 Days no previous admission in last 30 days   Community Agency Name(S) n/a   Equipment Needed After Discharge none   Discharge Facility/Level Of Care Needs (n/a)   Current Discharge Risk (n/a)   Discharge Disposition home or self-care  (n/a)   Discharge Planning Comments n/a   Current Health   Outpatient/Agency/Support Group Needs (n/a)   Anticipated Changes Related to Illness none   Self-Care   Equipment Currently Used at Home none   Living Environment   Transportation Available car;family or friend will provide       Problem: Postpartum, Vaginal Delivery (Adult)  Goal: Signs and Symptoms of Listed Potential Problems Will be Absent or Manageable (Postpartum, Vaginal Delivery)  Outcome: Outcome(s) achieved Date Met: 18      Problem: Breastfeeding (Adult,NICU,[place],Obstetrics,Pediatric)  Goal: Signs and Symptoms of Listed Potential Problems Will be Absent or Manageable (Breastfeeding)  Outcome: Outcome(s) achieved Date Met: 18

## 2018-03-07 NOTE — PLAN OF CARE
Problem: Patient Care Overview (Adult)  Goal: Plan of Care Review  Outcome: Outcome(s) achieved Date Met: 03/07/18 03/07/18 1218   Coping/Psychosocial Response Interventions   Plan Of Care Reviewed With patient   Patient Care Overview   Progress improving   Outcome Evaluation   Outcome Summary/Follow up Plan breastfeeding well, VSS voiding and stooling.ready for discharge

## 2020-01-03 ENCOUNTER — TRANSCRIBE ORDERS (OUTPATIENT)
Dept: MAMMOGRAPHY | Facility: HOSPITAL | Age: 45
End: 2020-01-03

## 2020-01-03 DIAGNOSIS — Z12.31 VISIT FOR SCREENING MAMMOGRAM: Primary | ICD-10-CM

## 2020-10-20 RX ORDER — VALACYCLOVIR HYDROCHLORIDE 500 MG/1
TABLET, FILM COATED ORAL
Qty: 10 TABLET | Refills: 2 | Status: SHIPPED | OUTPATIENT
Start: 2020-10-20 | End: 2021-11-11 | Stop reason: SDUPTHER

## 2020-12-14 PROBLEM — Z86.69 HISTORY OF MIGRAINE: Status: ACTIVE | Noted: 2020-12-14

## 2020-12-14 PROBLEM — Z87.42 HISTORY OF INFERTILITY: Status: ACTIVE | Noted: 2020-12-14

## 2020-12-14 PROBLEM — Z80.3 FAMILY HISTORY OF BREAST CANCER: Status: ACTIVE | Noted: 2020-12-14

## 2020-12-14 PROBLEM — A60.9 HSV (HERPES SIMPLEX VIRUS) ANOGENITAL INFECTION: Status: ACTIVE | Noted: 2020-12-14

## 2021-11-11 ENCOUNTER — OFFICE VISIT (OUTPATIENT)
Dept: OBSTETRICS AND GYNECOLOGY | Facility: CLINIC | Age: 46
End: 2021-11-11

## 2021-11-11 VITALS
BODY MASS INDEX: 25.21 KG/M2 | WEIGHT: 137 LBS | SYSTOLIC BLOOD PRESSURE: 110 MMHG | HEIGHT: 62 IN | DIASTOLIC BLOOD PRESSURE: 72 MMHG

## 2021-11-11 DIAGNOSIS — Z01.419 PAP TEST, AS PART OF ROUTINE GYNECOLOGICAL EXAMINATION: Primary | ICD-10-CM

## 2021-11-11 DIAGNOSIS — Z12.31 BREAST CANCER SCREENING BY MAMMOGRAM: ICD-10-CM

## 2021-11-11 DIAGNOSIS — A60.00 GENITAL HERPES SIMPLEX, UNSPECIFIED SITE: ICD-10-CM

## 2021-11-11 PROCEDURE — 99396 PREV VISIT EST AGE 40-64: CPT | Performed by: NURSE PRACTITIONER

## 2021-11-11 RX ORDER — VALACYCLOVIR HYDROCHLORIDE 500 MG/1
TABLET, FILM COATED ORAL
Qty: 10 TABLET | Refills: 12 | Status: SHIPPED | OUTPATIENT
Start: 2021-11-11 | End: 2022-11-22 | Stop reason: SDUPTHER

## 2021-11-11 RX ORDER — DIPHENOXYLATE HYDROCHLORIDE AND ATROPINE SULFATE 2.5; .025 MG/1; MG/1
TABLET ORAL DAILY
COMMUNITY

## 2021-11-11 NOTE — PROGRESS NOTES
GYN Annual Exam     CC - Here for annual exam.        HPI  Yamel Don is a 45 y.o. female, , who presents for annual well woman exam. Patient's last menstrual period was 10/17/2021..  Periods are regular every 25-35 days, lasting 5 days. .  Dysmenorrhea:mild, occurring first 1-2 days of flow.  Patient reports problems with: none. There were no changes to her medical or surgical history since her last visit.. Partner Status: Marital Status: .  New Partners since last visit: no.  Desires STD Screening: no.    Patient denies any other issues or concerns at today's visit. Patient is requesting a refill on her Valtrex.      Additional OB/GYN History   Current contraception: contraceptive methods: None  Desires to: do not start contraception  Last Pap :   Last Completed Pap Smear     This patient has no relevant Health Maintenance data.        History of abnormal Pap smear: yes - History of Abnormal Pap  Family history of uterine, colon, breast, or ovarian cancer: yes - Mother has breast cancer  Performs monthly Self-Breast Exam: yes  Last mammogram:     Last Completed Mammogram     This patient has no relevant Health Maintenance data.         Exercises Regularly: yes  Feelings of Anxiety or Depression: no  Tobacco Usage?: No   OB History        3    Para   1    Term   1       0    AB   2    Living   1       SAB   1    IAB   0    Ectopic   0    Molar        Multiple   0    Live Births   1                Health Maintenance   Topic Date Due   • Annual Gynecologic Pelvic and Breast Exam  Never done   • ANNUAL PHYSICAL  Never done   • TDAP/TD VACCINES (1 - Tdap) Never done   • HEPATITIS C SCREENING  Never done   • PAP SMEAR  Never done   • INFLUENZA VACCINE  Never done   • COVID-19 Vaccine  Completed   • Pneumococcal Vaccine 0-64  Aged Out       The additional following portions of the patient's history were reviewed and updated as appropriate: allergies, current medications, past family  "history, past medical history, past social history and past surgical history.    Review of Systems   Constitutional: Negative.    HENT: Negative.    Eyes: Negative.    Respiratory: Negative.    Cardiovascular: Negative.    Gastrointestinal: Negative.    Endocrine: Negative.    Genitourinary: Negative.    Musculoskeletal: Negative.    Skin: Negative.    Allergic/Immunologic: Negative.    Neurological: Negative.    Hematological: Negative.    Psychiatric/Behavioral: Negative.          I have reviewed and agree with the HPI, ROS, and historical information as entered above. Radha Sherlyn Wheelerf, APRN    Objective   /72   Ht 157.5 cm (62\")   Wt 62.1 kg (137 lb)   LMP 10/17/2021   Breastfeeding No   BMI 25.06 kg/m²     Physical Exam  Vitals and nursing note reviewed. Exam conducted with a chaperone present.   Constitutional:       General: She is not in acute distress.     Appearance: Normal appearance. She is well-developed and normal weight. She is not ill-appearing.   HENT:      Head: Normocephalic and atraumatic.   Neck:      Thyroid: No thyroid mass or thyromegaly.   Pulmonary:      Effort: Pulmonary effort is normal. No retractions.   Chest:      Chest wall: No mass.   Breasts:      Right: Normal. No mass, nipple discharge, skin change or tenderness.      Left: Normal. No mass, nipple discharge, skin change or tenderness.       Abdominal:      Palpations: Abdomen is soft. Abdomen is not rigid. There is no mass.      Tenderness: There is no abdominal tenderness. There is no guarding.      Hernia: No hernia is present. There is no hernia in the left inguinal area.   Genitourinary:     General: Normal vulva.      Labia:         Right: No rash, tenderness or lesion.         Left: No rash, tenderness or lesion.       Vagina: Normal. No vaginal discharge or lesions.      Cervix: Normal.      Uterus: Normal. Not enlarged, not fixed and not tender.       Adnexa: Right adnexa normal and left adnexa normal.        Right: " No mass or tenderness.          Left: No mass or tenderness.        Rectum: No external hemorrhoid.   Musculoskeletal:      Cervical back: Normal range of motion. No muscular tenderness.   Skin:     General: Skin is warm and dry.   Neurological:      Mental Status: She is alert and oriented to person, place, and time.   Psychiatric:         Mood and Affect: Mood normal.         Behavior: Behavior normal.            Assessment and Plan    Problem List Items Addressed This Visit     None      Visit Diagnoses     Pap test, as part of routine gynecological examination    -  Primary    Relevant Orders    Pap IG, HPV-hr    Genital herpes simplex, unspecified site        Breast cancer screening by mammogram              1. GYN annual well woman exam.   2. Reviewed monthly self breast exams.  Instructed to call with lumps, pain, or breast discharge.    3. Ordered Mammogram today  4. Reviewed exercise as a preventative health measures.   5. Reccommended Flu Vaccine in Fall of each year.  6. Symptoms of menopausal transition reviewed with patient.   7. RTC in 1 year or PRN with problems.  8. Return in about 1 year (around 11/11/2022) for Annual physical.     Radha Gamino, APRN  11/11/2021

## 2021-11-19 DIAGNOSIS — Z01.419 PAP TEST, AS PART OF ROUTINE GYNECOLOGICAL EXAMINATION: ICD-10-CM

## 2022-11-22 ENCOUNTER — OFFICE VISIT (OUTPATIENT)
Dept: OBSTETRICS AND GYNECOLOGY | Facility: CLINIC | Age: 47
End: 2022-11-22

## 2022-11-22 VITALS
HEIGHT: 62 IN | SYSTOLIC BLOOD PRESSURE: 122 MMHG | WEIGHT: 141 LBS | BODY MASS INDEX: 25.95 KG/M2 | DIASTOLIC BLOOD PRESSURE: 82 MMHG

## 2022-11-22 DIAGNOSIS — A60.00 GENITAL HERPES SIMPLEX, UNSPECIFIED SITE: ICD-10-CM

## 2022-11-22 DIAGNOSIS — Z01.419 WOMEN'S ANNUAL ROUTINE GYNECOLOGICAL EXAMINATION: Primary | ICD-10-CM

## 2022-11-22 DIAGNOSIS — Z80.3 FAMILY HISTORY OF BREAST CANCER: ICD-10-CM

## 2022-11-22 DIAGNOSIS — Z12.31 BREAST CANCER SCREENING BY MAMMOGRAM: ICD-10-CM

## 2022-11-22 DIAGNOSIS — Z87.42 HISTORY OF ABNORMAL CERVICAL PAP SMEAR: ICD-10-CM

## 2022-11-22 PROCEDURE — 99396 PREV VISIT EST AGE 40-64: CPT | Performed by: NURSE PRACTITIONER

## 2022-11-22 RX ORDER — SUMATRIPTAN 100 MG/1
100 TABLET, FILM COATED ORAL DAILY PRN
COMMUNITY
Start: 2022-10-26

## 2022-11-22 RX ORDER — VALACYCLOVIR HYDROCHLORIDE 500 MG/1
TABLET, FILM COATED ORAL
Qty: 10 TABLET | Refills: 12 | Status: SHIPPED | OUTPATIENT
Start: 2022-11-22

## 2022-11-22 NOTE — PROGRESS NOTES
Gynecologic Annual Exam Note          GYN Annual Exam     Gynecologic Exam        Subjective     HPI  Yamel Don is a 46 y.o. female, , who presents for annual well woman exam as a established patient.  Periods are regular every 25-35 days, lasting 4 days. The flow is moderate. Dysmenorrhea:mild, occurring first 1-2 days of flow.  Patient reports problems with: none. Partner Status: Marital Status: . She is is sexually active. She has not had new partners. STD testing recommendations have been explained to the patient and she does not desire STD testing. There were no changes to her medical or surgical history since her last visit.     Patient requesting Pap smear this year.      Additional OB/GYN History   Current contraception: contraceptive methods: None  Desires to: do not start contraception    Last Pap : 2021. Result: negative. HPV: negative  Last Completed Pap Smear          Ordered - PAP SMEAR (Every 3 Years) Ordered on 2022  SCANNED - PAP SMEAR              History of abnormal Pap smear: yes - h/o abnormal pap- repeats normal  Family history of uterine, colon, breast, or ovarian cancer: yes - Mother- breast CA 50s or 60s  Performs monthly Self-Breast Exam: no  Last mammogram: Patient has not had a mammogram.      Colonoscopy: has never had a colonoscopy.  Exercises Regularly: yes  Feelings of Anxiety or Depression: no  Tobacco Usage?: No       Current Outpatient Medications:   •  docusate sodium (COLACE) 100 MG capsule, Take 100 mg by mouth Daily., Disp: , Rfl:   •  ibuprofen (ADVIL,MOTRIN) 600 MG tablet, Take 1 tablet by mouth Every 6 (Six) Hours As Needed for Mild Pain ., Disp: 35 tablet, Rfl: 1  •  multivitamin (THERAGRAN) tablet tablet, Take  by mouth Daily., Disp: , Rfl:   •  Omega-3 Fatty Acids (OMEGA 3 PO), Take 1 capsule by mouth Daily., Disp: , Rfl:   •  Probiotic Product (PROBIOTIC DAILY PO), Take 1 capsule by mouth Daily., Disp: , Rfl:   •   SUMAtriptan (IMITREX) 100 MG tablet, Take 100 mg by mouth Daily As Needed., Disp: , Rfl:   •  valACYclovir (VALTREX) 500 MG tablet, 1 po bid x 5 days prn, Disp: 10 tablet, Rfl: 12     Patient is requesting refills of Valtrex.    OB History        3    Para   1    Term   1       0    AB   2    Living   1       SAB   1    IAB   0    Ectopic   0    Molar        Multiple   0    Live Births   1                Past Medical History:   Diagnosis Date   • Abnormal Pap smear of cervix    • HPV (human papilloma virus) infection    • Infertility, female     unexplained   • Migraine    • Recurrent pregnancy loss, currently pregnant     miscarrigae x2        Past Surgical History:   Procedure Laterality Date   • ANKLE SURGERY Left     Achilles rupture repair   • DILATATION AND CURETTAGE  2014   • OTHER SURGICAL HISTORY  2017    Oocyte retrieval for IVF   • WISDOM TOOTH EXTRACTION         Health Maintenance   Topic Date Due   • MAMMOGRAM  Never done   • COVID-19 Vaccine (1) Never done   • ANNUAL PHYSICAL  Never done   • TDAP/TD VACCINES (1 - Tdap) Never done   • HEPATITIS C SCREENING  Never done   • INFLUENZA VACCINE  Never done   • Annual Gynecologic Pelvic and Breast Exam  2022   • PAP SMEAR  2024   • Pneumococcal Vaccine 0-64  Aged Out       The additional following portions of the patient's history were reviewed and updated as appropriate: allergies, current medications, past family history, past medical history, past social history, past surgical history and problem list.    Review of Systems   Constitutional: Negative.    HENT: Negative.    Eyes: Negative.    Respiratory: Negative.    Cardiovascular: Negative.    Gastrointestinal: Negative.    Endocrine: Negative.    Genitourinary: Negative.    Musculoskeletal: Negative.    Skin: Negative.    Allergic/Immunologic: Negative.    Neurological: Negative.    Hematological: Negative.    Psychiatric/Behavioral: Negative.          I have reviewed and  "agree with the HPI, ROS, and historical information as entered above. Radha Gamino, APRN      Objective   /82   Ht 157.5 cm (62\")   Wt 64 kg (141 lb)   LMP 11/05/2022   BMI 25.79 kg/m²     Physical Exam  Vitals and nursing note reviewed. Exam conducted with a chaperone present.   Constitutional:       General: She is not in acute distress.     Appearance: Normal appearance. She is well-developed and normal weight. She is not ill-appearing.   HENT:      Head: Normocephalic and atraumatic.   Neck:      Thyroid: No thyroid mass or thyromegaly.   Pulmonary:      Effort: Pulmonary effort is normal. No respiratory distress or retractions.   Chest:      Chest wall: No mass.   Breasts:     Right: Normal. No mass, nipple discharge, skin change or tenderness.      Left: Normal. No mass, nipple discharge, skin change or tenderness.   Abdominal:      Palpations: Abdomen is soft. Abdomen is not rigid. There is no mass.      Tenderness: There is no abdominal tenderness. There is no guarding.      Hernia: No hernia is present. There is no hernia in the left inguinal area.   Genitourinary:     General: Normal vulva.      Labia:         Right: No rash, tenderness or lesion.         Left: No rash, tenderness or lesion.       Vagina: Normal. No vaginal discharge or lesions.      Cervix: Normal.      Uterus: Normal. Not enlarged, not fixed and not tender.       Adnexa: Right adnexa normal and left adnexa normal.        Right: No mass or tenderness.          Left: No mass or tenderness.        Rectum: No external hemorrhoid.   Musculoskeletal:      Cervical back: Normal range of motion. No muscular tenderness.   Skin:     General: Skin is warm and dry.   Neurological:      Mental Status: She is alert and oriented to person, place, and time.   Psychiatric:         Mood and Affect: Mood normal.         Behavior: Behavior normal.            Assessment and Plan    Problem List Items Addressed This Visit        Family History    " Family history of breast cancer    Overview     Diagnosed in patient's mother         Relevant Orders    Mammo Screening Digital Tomosynthesis Bilateral With CAD    Ambulatory Referral to Genetic Counseling/Testing   Other Visit Diagnoses     Women's annual routine gynecological examination    -  Primary    Relevant Orders    LIQUID-BASED PAP SMEAR, [facility] ([facility])    History of abnormal cervical Pap smear        Relevant Orders    LIQUID-BASED PAP SMEAR, [facility] ([facility])    Breast cancer screening by mammogram        Relevant Orders    Mammo Screening Digital Tomosynthesis Bilateral With CAD          1. GYN annual well woman exam.   2. Pap guidelines reviewed.  3. Reviewed monthly self breast exams.  Instructed to call with lumps, pain, or breast discharge.    4. Ordered Mammogram today  5. Reviewed exercise as a preventative health measures.   6. Reccommended Flu Vaccine in Fall of each year.  7. Symptoms of menopausal transition reviewed with patient.   8. RTC in 1 year or PRN with problems.  9. Return in about 1 year (around 11/22/2023) for Annual physical.   10. Referred to genetics for mother with hx of breast cancer.  Rev option of BRCA testing and breast MRI.      Radha Gamino, APRN  11/22/2022

## 2022-11-25 LAB — REF LAB TEST METHOD: NORMAL

## 2023-11-28 ENCOUNTER — OFFICE VISIT (OUTPATIENT)
Dept: OBSTETRICS AND GYNECOLOGY | Facility: CLINIC | Age: 48
End: 2023-11-28
Payer: COMMERCIAL

## 2023-11-28 VITALS
HEIGHT: 62 IN | SYSTOLIC BLOOD PRESSURE: 118 MMHG | WEIGHT: 149 LBS | BODY MASS INDEX: 27.42 KG/M2 | DIASTOLIC BLOOD PRESSURE: 80 MMHG

## 2023-11-28 DIAGNOSIS — Z12.31 BREAST CANCER SCREENING BY MAMMOGRAM: ICD-10-CM

## 2023-11-28 DIAGNOSIS — Z12.11 SCREENING FOR COLON CANCER: ICD-10-CM

## 2023-11-28 DIAGNOSIS — Z01.419 WOMEN'S ANNUAL ROUTINE GYNECOLOGICAL EXAMINATION: ICD-10-CM

## 2023-11-28 DIAGNOSIS — Z87.42 HX OF ABNORMAL CERVICAL PAP SMEAR: Primary | ICD-10-CM

## 2023-11-28 DIAGNOSIS — A60.00 GENITAL HERPES SIMPLEX, UNSPECIFIED SITE: ICD-10-CM

## 2023-11-28 DIAGNOSIS — A60.9 HSV (HERPES SIMPLEX VIRUS) ANOGENITAL INFECTION: ICD-10-CM

## 2023-11-28 RX ORDER — VALACYCLOVIR HYDROCHLORIDE 500 MG/1
TABLET, FILM COATED ORAL
Qty: 90 TABLET | Refills: 3 | Status: SHIPPED | OUTPATIENT
Start: 2023-11-28

## 2024-05-28 ENCOUNTER — HOSPITAL ENCOUNTER (OUTPATIENT)
Dept: MAMMOGRAPHY | Facility: HOSPITAL | Age: 49
Discharge: HOME | End: 2024-05-28
Admitting: NURSE PRACTITIONER
Payer: COMMERCIAL

## 2024-05-28 DIAGNOSIS — Z12.31 BREAST CANCER SCREENING BY MAMMOGRAM: ICD-10-CM

## 2024-05-28 PROCEDURE — 77063 BREAST TOMOSYNTHESIS BI: CPT

## 2024-05-28 PROCEDURE — 77067 SCR MAMMO BI INCL CAD: CPT

## 2024-08-06 ENCOUNTER — APPOINTMENT (OUTPATIENT)
Dept: CT IMAGING | Facility: HOSPITAL | Age: 49
End: 2024-08-06
Payer: COMMERCIAL

## 2024-08-06 ENCOUNTER — HOSPITAL ENCOUNTER (EMERGENCY)
Facility: HOSPITAL | Age: 49
Discharge: HOME | End: 2024-08-06
Attending: STUDENT IN AN ORGANIZED HEALTH CARE EDUCATION/TRAINING PROGRAM | Admitting: STUDENT IN AN ORGANIZED HEALTH CARE EDUCATION/TRAINING PROGRAM
Payer: COMMERCIAL

## 2024-08-06 VITALS
RESPIRATION RATE: 18 BRPM | DIASTOLIC BLOOD PRESSURE: 84 MMHG | BODY MASS INDEX: 27.6 KG/M2 | TEMPERATURE: 98 F | HEIGHT: 62 IN | SYSTOLIC BLOOD PRESSURE: 121 MMHG | HEART RATE: 96 BPM | OXYGEN SATURATION: 98 % | WEIGHT: 150 LBS

## 2024-08-06 DIAGNOSIS — R10.9 ABDOMINAL PAIN, UNSPECIFIED ABDOMINAL LOCATION: Primary | ICD-10-CM

## 2024-08-06 DIAGNOSIS — N83.209 CYST OF OVARY, UNSPECIFIED LATERALITY: ICD-10-CM

## 2024-08-06 DIAGNOSIS — D25.9 UTERINE LEIOMYOMA, UNSPECIFIED LOCATION: ICD-10-CM

## 2024-08-06 LAB
ALBUMIN SERPL-MCNC: 4.7 G/DL (ref 3.5–5.2)
ALBUMIN/GLOB SERPL: 1.6 G/DL
ALP SERPL-CCNC: 89 U/L (ref 39–117)
ALT SERPL W P-5'-P-CCNC: 18 U/L (ref 1–33)
ANION GAP SERPL CALCULATED.3IONS-SCNC: 14.2 MMOL/L (ref 5–15)
AST SERPL-CCNC: 22 U/L (ref 1–32)
B-HCG UR QL: NEGATIVE
BACTERIA UR QL AUTO: ABNORMAL /HPF
BASOPHILS # BLD AUTO: 0.03 10*3/MM3 (ref 0–0.2)
BASOPHILS NFR BLD AUTO: 0.5 % (ref 0–1.5)
BILIRUB SERPL-MCNC: 0.9 MG/DL (ref 0–1.2)
BILIRUB UR QL STRIP: NEGATIVE
BUN SERPL-MCNC: 8 MG/DL (ref 6–20)
BUN/CREAT SERPL: 10.5 (ref 7–25)
CALCIUM SPEC-SCNC: 9.1 MG/DL (ref 8.6–10.5)
CHLORIDE SERPL-SCNC: 102 MMOL/L (ref 98–107)
CLARITY UR: CLEAR
CO2 SERPL-SCNC: 24.8 MMOL/L (ref 22–29)
COLOR UR: ABNORMAL
CREAT SERPL-MCNC: 0.76 MG/DL (ref 0.57–1)
DEPRECATED RDW RBC AUTO: 39.7 FL (ref 37–54)
EGFRCR SERPLBLD CKD-EPI 2021: 98 ML/MIN/1.73
EOSINOPHIL # BLD AUTO: 0.09 10*3/MM3 (ref 0–0.4)
EOSINOPHIL NFR BLD AUTO: 1.4 % (ref 0.3–6.2)
ERYTHROCYTE [DISTWIDTH] IN BLOOD BY AUTOMATED COUNT: 11.5 % (ref 12.3–15.4)
GLOBULIN UR ELPH-MCNC: 2.9 GM/DL
GLUCOSE SERPL-MCNC: 113 MG/DL (ref 65–99)
GLUCOSE UR STRIP-MCNC: NEGATIVE MG/DL
HCT VFR BLD AUTO: 43.9 % (ref 34–46.6)
HGB BLD-MCNC: 15.5 G/DL (ref 12–15.9)
HGB UR QL STRIP.AUTO: NEGATIVE
HOLD SPECIMEN: NORMAL
HOLD SPECIMEN: NORMAL
HYALINE CASTS UR QL AUTO: ABNORMAL /LPF
IMM GRANULOCYTES # BLD AUTO: 0.03 10*3/MM3 (ref 0–0.05)
IMM GRANULOCYTES NFR BLD AUTO: 0.5 % (ref 0–0.5)
KETONES UR QL STRIP: ABNORMAL
LEUKOCYTE ESTERASE UR QL STRIP.AUTO: ABNORMAL
LIPASE SERPL-CCNC: 20 U/L (ref 13–60)
LYMPHOCYTES # BLD AUTO: 1.85 10*3/MM3 (ref 0.7–3.1)
LYMPHOCYTES NFR BLD AUTO: 28.8 % (ref 19.6–45.3)
MCH RBC QN AUTO: 33 PG (ref 26.6–33)
MCHC RBC AUTO-ENTMCNC: 35.3 G/DL (ref 31.5–35.7)
MCV RBC AUTO: 93.4 FL (ref 79–97)
MONOCYTES # BLD AUTO: 0.46 10*3/MM3 (ref 0.1–0.9)
MONOCYTES NFR BLD AUTO: 7.2 % (ref 5–12)
NEUTROPHILS NFR BLD AUTO: 3.96 10*3/MM3 (ref 1.7–7)
NEUTROPHILS NFR BLD AUTO: 61.6 % (ref 42.7–76)
NITRITE UR QL STRIP: POSITIVE
NRBC BLD AUTO-RTO: 0 /100 WBC (ref 0–0.2)
PH UR STRIP.AUTO: 7 [PH] (ref 5–8)
PLATELET # BLD AUTO: 286 10*3/MM3 (ref 140–450)
PMV BLD AUTO: 8.7 FL (ref 6–12)
POTASSIUM SERPL-SCNC: 3.4 MMOL/L (ref 3.5–5.2)
PROT SERPL-MCNC: 7.6 G/DL (ref 6–8.5)
PROT UR QL STRIP: NEGATIVE
RBC # BLD AUTO: 4.7 10*6/MM3 (ref 3.77–5.28)
RBC # UR STRIP: ABNORMAL /HPF
REF LAB TEST METHOD: ABNORMAL
SODIUM SERPL-SCNC: 141 MMOL/L (ref 136–145)
SP GR UR STRIP: 1.01 (ref 1–1.03)
SQUAMOUS #/AREA URNS HPF: ABNORMAL /HPF
UROBILINOGEN UR QL STRIP: ABNORMAL
WBC # UR STRIP: ABNORMAL /HPF
WBC NRBC COR # BLD AUTO: 6.42 10*3/MM3 (ref 3.4–10.8)
WHOLE BLOOD HOLD COAG: NORMAL
WHOLE BLOOD HOLD SPECIMEN: NORMAL

## 2024-08-06 PROCEDURE — 25510000001 IOPAMIDOL 61 % SOLUTION: Performed by: STUDENT IN AN ORGANIZED HEALTH CARE EDUCATION/TRAINING PROGRAM

## 2024-08-06 PROCEDURE — 83690 ASSAY OF LIPASE: CPT | Performed by: STUDENT IN AN ORGANIZED HEALTH CARE EDUCATION/TRAINING PROGRAM

## 2024-08-06 PROCEDURE — 85025 COMPLETE CBC W/AUTO DIFF WBC: CPT | Performed by: STUDENT IN AN ORGANIZED HEALTH CARE EDUCATION/TRAINING PROGRAM

## 2024-08-06 PROCEDURE — 87077 CULTURE AEROBIC IDENTIFY: CPT | Performed by: NURSE PRACTITIONER

## 2024-08-06 PROCEDURE — 71275 CT ANGIOGRAPHY CHEST: CPT

## 2024-08-06 PROCEDURE — 81025 URINE PREGNANCY TEST: CPT | Performed by: NURSE PRACTITIONER

## 2024-08-06 PROCEDURE — 80053 COMPREHEN METABOLIC PANEL: CPT | Performed by: STUDENT IN AN ORGANIZED HEALTH CARE EDUCATION/TRAINING PROGRAM

## 2024-08-06 PROCEDURE — 99285 EMERGENCY DEPT VISIT HI MDM: CPT

## 2024-08-06 PROCEDURE — 87086 URINE CULTURE/COLONY COUNT: CPT | Performed by: NURSE PRACTITIONER

## 2024-08-06 PROCEDURE — 74177 CT ABD & PELVIS W/CONTRAST: CPT

## 2024-08-06 PROCEDURE — 81001 URINALYSIS AUTO W/SCOPE: CPT | Performed by: STUDENT IN AN ORGANIZED HEALTH CARE EDUCATION/TRAINING PROGRAM

## 2024-08-06 PROCEDURE — 25810000003 SODIUM CHLORIDE 0.9 % SOLUTION: Performed by: NURSE PRACTITIONER

## 2024-08-06 RX ORDER — ONDANSETRON 2 MG/ML
4 INJECTION INTRAMUSCULAR; INTRAVENOUS ONCE
Status: DISCONTINUED | OUTPATIENT
Start: 2024-08-06 | End: 2024-08-06 | Stop reason: HOSPADM

## 2024-08-06 RX ORDER — SODIUM CHLORIDE 0.9 % (FLUSH) 0.9 %
10 SYRINGE (ML) INJECTION AS NEEDED
Status: DISCONTINUED | OUTPATIENT
Start: 2024-08-06 | End: 2024-08-06 | Stop reason: HOSPADM

## 2024-08-06 RX ADMIN — IOPAMIDOL 100 ML: 612 INJECTION, SOLUTION INTRAVENOUS at 11:52

## 2024-08-06 RX ADMIN — SODIUM CHLORIDE 1000 ML: 9 INJECTION, SOLUTION INTRAVENOUS at 10:19

## 2024-08-06 NOTE — ED PROVIDER NOTES
Pt Name: Yamel Don  MRN: [id number]  : 1975  Date of Encounter: 2024    PCP: Provider, No Known      Subjective    History of Present Illness:    Chief Complaint: Abdominal pain    History of Present Illness: Yamel Don is a 48 y.o. female who presents to the ER complaining of epigastric pain that started 2 months ago.  Pain is described as Dull, Intermittent, and does to radiate   Patient rates pain as a 6 on a ten scale.    Triage Vitals:    ED Triage Vitals [24 0955]   Temp Heart Rate Resp BP SpO2   98 °F (36.7 °C) 96 18 145/94 100 %      Temp src Heart Rate Source Patient Position BP Location FiO2 (%)   Oral Monitor Sitting Left arm --       Nurses Notes reviewed and agree, including vitals, allergies, social history and prior medical history.     Penicillins    Past Medical History:   Diagnosis Date    HPV (human papilloma virus) infection     Infertility, female     unexplained    Migraine     Recurrent pregnancy loss, currently pregnant     miscarrigae x2       Past Surgical History:   Procedure Laterality Date    ANKLE SURGERY Left     Achilles rupture repair    DILATATION AND CURETTAGE  2014    OTHER SURGICAL HISTORY  2017    Oocyte retrieval for IVF    WISDOM TOOTH EXTRACTION         Social History     Socioeconomic History    Marital status:    Tobacco Use    Smoking status: Never    Smokeless tobacco: Never   Vaping Use    Vaping status: Every Day   Substance and Sexual Activity    Alcohol use: No    Drug use: No    Sexual activity: Yes     Partners: Male     Birth control/protection: None       Family History   Problem Relation Age of Onset    Hypertension Father     Breast cancer Mother     Ovarian cancer Neg Hx     Uterine cancer Neg Hx     Colon cancer Neg Hx     Melanoma Neg Hx     Prostate cancer Neg Hx     Stroke Neg Hx     Osteoporosis Neg Hx     Diabetes Neg Hx        REVIEW OF SYSTEMS:     All systems reviewed and not pertinent unless  noted.    Review of Systems   Gastrointestinal:  Positive for abdominal pain.   All other systems reviewed and are negative.      Objective    Physical Exam  Vitals and nursing note reviewed.   Constitutional:       Appearance: Normal appearance.   HENT:      Head: Normocephalic and atraumatic.   Eyes:      Extraocular Movements: Extraocular movements intact.      Pupils: Pupils are equal, round, and reactive to light.   Cardiovascular:      Rate and Rhythm: Normal rate and regular rhythm.      Pulses: Normal pulses.      Heart sounds: Normal heart sounds.   Pulmonary:      Effort: Pulmonary effort is normal.      Breath sounds: Normal breath sounds.   Abdominal:      General: Abdomen is flat. Bowel sounds are normal.      Palpations: Abdomen is soft.      Tenderness: There is generalized abdominal tenderness.   Musculoskeletal:      Cervical back: Normal range of motion and neck supple.   Skin:     Capillary Refill: Capillary refill takes less than 2 seconds.   Neurological:      General: No focal deficit present.      Mental Status: She is alert and oriented to person, place, and time. Mental status is at baseline.      GCS: GCS eye subscore is 4. GCS verbal subscore is 5. GCS motor subscore is 6.      Sensory: Sensation is intact.      Motor: Motor function is intact.      Gait: Gait is intact.   Psychiatric:         Attention and Perception: Attention and perception normal.         Mood and Affect: Mood and affect normal.         Speech: Speech normal.         Behavior: Behavior normal. Behavior is cooperative.                           Procedures    ED Course:    No orders to display            Orders placed during this visit:    Orders Placed This Encounter   Procedures    Urine Culture - Urine,    CT Angiogram Chest    CT Abdomen Pelvis With Contrast    Loman Draw    Comprehensive Metabolic Panel    Lipase    Urinalysis With Microscopic If Indicated (No Culture) - Urine, Clean Catch    CBC Auto Differential     Pregnancy, Urine - Urine, Clean Catch    Urinalysis, Microscopic Only - Urine, Clean Catch    NPO Diet NPO Type: Strict NPO    Undress & Gown    Insert Peripheral IV    Insert Peripheral IV    CBC & Differential    Green Top (Gel)    Lavender Top    Gold Top - SST    Light Blue Top       LAB Results:    Lab Results (last 24 hours)       Procedure Component Value Units Date/Time    CBC & Differential [[id number]]  (Abnormal) Collected: 08/06/24 1008    Specimen: Blood Updated: 08/06/24 1021    Narrative:      The following orders were created for panel order CBC & Differential.  Procedure                               Abnormality         Status                     ---------                               -----------         ------                     CBC Auto Differential[[id number]]        Abnormal            Final result                 Please view results for these tests on the individual orders.    Comprehensive Metabolic Panel [[id number]]  (Abnormal) Collected: 08/06/24 1008    Specimen: Blood Updated: 08/06/24 1035     Glucose 113 mg/dL      BUN 8 mg/dL      Creatinine 0.76 mg/dL      Sodium 141 mmol/L      Potassium 3.4 mmol/L      Chloride 102 mmol/L      CO2 24.8 mmol/L      Calcium 9.1 mg/dL      Total Protein 7.6 g/dL      Albumin 4.7 g/dL      ALT (SGPT) 18 U/L      AST (SGOT) 22 U/L      Alkaline Phosphatase 89 U/L      Total Bilirubin 0.9 mg/dL      Globulin 2.9 gm/dL      A/G Ratio 1.6 g/dL      BUN/Creatinine Ratio 10.5     Anion Gap 14.2 mmol/L      eGFR 98.0 mL/min/1.73     Narrative:      GFR Normal >60  Chronic Kidney Disease <60  Kidney Failure <15      Lipase [[id number]]  (Normal) Collected: 08/06/24 1008    Specimen: Blood Updated: 08/06/24 1035     Lipase 20 U/L     CBC Auto Differential [[id number]]  (Abnormal) Collected: 08/06/24 1008    Specimen: Blood Updated: 08/06/24 1021     WBC 6.42 10*3/mm3      RBC 4.70 10*6/mm3      Hemoglobin 15.5 g/dL      Hematocrit 43.9 %      MCV 93.4 fL       MCH 33.0 pg      MCHC 35.3 g/dL      RDW 11.5 %      RDW-SD 39.7 fl      MPV 8.7 fL      Platelets 286 10*3/mm3      Neutrophil % 61.6 %      Lymphocyte % 28.8 %      Monocyte % 7.2 %      Eosinophil % 1.4 %      Basophil % 0.5 %      Immature Grans % 0.5 %      Neutrophils, Absolute 3.96 10*3/mm3      Lymphocytes, Absolute 1.85 10*3/mm3      Monocytes, Absolute 0.46 10*3/mm3      Eosinophils, Absolute 0.09 10*3/mm3      Basophils, Absolute 0.03 10*3/mm3      Immature Grans, Absolute 0.03 10*3/mm3      nRBC 0.0 /100 WBC     Urinalysis With Microscopic If Indicated (No Culture) - Urine, Clean Catch [[id number]]  (Abnormal) Collected: 08/06/24 1020    Specimen: Urine, Clean Catch Updated: 08/06/24 1031     Color, UA Dark Yellow     Appearance, UA Clear     pH, UA 7.0     Specific Gravity, UA 1.008     Glucose, UA Negative     Ketones, UA Trace     Bilirubin, UA Negative     Blood, UA Negative     Protein, UA Negative     Leuk Esterase, UA Trace     Nitrite, UA Positive     Urobilinogen, UA 1.0 E.U./dL    Urinalysis, Microscopic Only - Urine, Clean Catch [[id number]]  (Abnormal) Collected: 08/06/24 1020    Specimen: Urine, Clean Catch Updated: 08/06/24 1036     RBC, UA 0-2 /HPF      WBC, UA 3-5 /HPF      Bacteria, UA Trace /HPF      Squamous Epithelial Cells, UA 3-6 /HPF      Hyaline Casts, UA None Seen /LPF      Methodology Manual Light Microscopy    Pregnancy, Urine - Urine, Clean Catch [[id number]]  (Normal) Collected: 08/06/24 1021    Specimen: Urine, Clean Catch Updated: 08/06/24 1132     HCG, Urine QL Negative    Urine Culture - Urine, Urine, Clean Catch [[id number]] Collected: 08/06/24 1021    Specimen: Urine, Clean Catch Updated: 08/06/24 1131             If labs were ordered, I have independently reviewed the results and considered them in the diagnosis and treatment plan for the patient    RADIOLOGY    CT Abdomen Pelvis With Contrast    Result Date: 8/6/2024  PROCEDURE: CT ABDOMEN PELVIS W CONTRAST-  HISTORY:  Abdominal pain, diffuse  COMPARISON: None.  PROCEDURE: The patient was injected with IV contrast. Oral contrast was administered. Axial images were obtained from the lung bases to the pubic symphysis by computed tomography. This study was performed with techniques to keep radiation doses as low as reasonably achievable, (ALARA). Individualized dose reduction techniques using automated exposure control or adjustment of mA and/or kV according to the patient size were employed.  FINDINGS:  ABDOMEN: The lung bases are clear. The heart is proper size. The liver demonstrates a 4 mm circumscribed, hypodense lesion in inferior lateral right lobe most consistent with a cyst. Similar lesion is seen adjacent to the gallbladder also likely a cyst. Gallbladder present with no CT visible stones. Gallbladder present with no CT visible stones.. The spleen is unremarkable. No adrenal mass is present.  The pancreas is unremarkable. The kidneys are unremarkable, without evidence of mass or hydronephrosis. The aorta is proper caliber. There is no free fluid or adenopathy.  PELVIS: The GI tract demonstrates no obstruction.  The appendix is is identified and appears normal. The urinary bladder is unremarkable. There is no free fluid, adenopathy, or inflammatory process. Uterus is midline and prominent in size. There is a enhancing lesion in the right side of the uterus measuring 4 cm, suspect fibroid. There appear to be additional smaller lesions that also likely represent fibroids. Pelvic ultrasound may be helpful. There is a 2 cm oval circumscribed lesion adjacent to the left side of the uterus, suspect ovarian cyst.      Impression: Suspected uterine fibroids measuring up to 4 cm.  Suspected left ovarian cyst 2 cm. Pelvic ultrasound may be helpful.  CTDI: 2.01 mGy DLP:383.02 mGy.cm  This report was signed and finalized on 8/6/2024 12:18 PM by Patricia Al MD.      CT Angiogram Chest    Result Date: 8/6/2024  PROCEDURE: CT ANGIOGRAM  CHEST-  HISTORY: Uneasy, upper abdominal pain, chest pain  COMPARISON: None.  TECHNIQUE: The patient was injected with  IV contrast. Axial images were obtained through the chest in a CTA/ PE protocol. 3 D Reconstruction images were also performed. This study was performed with techniques to keep radiation doses as low as reasonably achievable, (ALARA). Individualized dose reduction techniques using automated exposure control or adjustment of mA and/or kV according to the patient size were employed.  FINDINGS: There is no axillary adenopathy. There is no hilar or mediastinal adenopathy.  The heart is proper size. There is no pericardial or pleural effusion. No filling defects are identified to suggest PE. There is no evidence of thoracic aortic aneurysm or dissection. Limited images of the upper abdomen are unremarkable. No suspicious infiltrate or nodule is identified. Reflux of contrast into the IVC and hepatic veins noted.      Impression: No evidence of pulmonary embolism.     CTDI: 2.01 mGy DLP:383.02 mGy.cm  This report was signed and finalized on 8/6/2024 12:13 PM by Patricia Al MD.        If I have ordered, I have independently reviewed the above noted radiographic studies.  Please see the radiologist dictation for the official interpretation    Medications given to patient in the ER    Medications   sodium chloride 0.9 % flush 10 mL (has no administration in time range)   ondansetron (ZOFRAN) injection 4 mg (4 mg Intravenous Not Given 8/6/24 1020)   sodium chloride 0.9 % flush 10 mL (has no administration in time range)   sodium chloride 0.9 % bolus 1,000 mL (0 mL Intravenous Stopped 8/6/24 1130)   iopamidol (ISOVUE-300) 61 % injection 100 mL (100 mL Intravenous Given 8/6/24 1152)       AS OF 12:27 EDT VITALS:    BP - 121/84  HR - 96  TEMP - 98 °F (36.7 °C) (Oral)  O2 SATS - 98%         Shared Decision Making: After my consideration of the clinical presentation and laboratory/radiology studies obtained, I  have discussed the findings with the patient/patient representative who is in agreement with the treatment plan and final disposition. Risks and benefits of discharge and/or observation admission were discussed.  Final disposition of the patient will be discharged home.  Patient is requested to follow-up with primary care provider and specialist in 1 week following final discharge.    Discharge Medications Prescribed:  No new home medications were prescribed during this ER visit    Medical Decision Making  : Yamel Don is a 48 y.o. female who presents to the ER complaining of epigastric pain that started 2 months ago.  Pain is described as Dull, Intermittent, and does to radiate   Patient rates pain as a 6 on a ten scale.    DDX: includes but is not limited to: Cholecystitis, appendicitis, pancreatitis, gastroenteritis, diverticulitis, other    Problems Addressed:  Abdominal pain, unspecified abdominal location: complicated acute illness or injury  Cyst of ovary, unspecified laterality: complicated acute illness or injury  Uterine leiomyoma, unspecified location: complicated acute illness or injury    Amount and/or Complexity of Data Reviewed  Labs: ordered. Decision-making details documented in ED Course.     Details: I have personally reviewed and documented all results  Radiology: ordered. Decision-making details documented in ED Course.     Details: I have personally reviewed and documented all results  Discussion of management or test interpretation with external provider(s): Discussed assessment, treatment and plan with ER attending    Risk  Prescription drug management.  Risk Details: I have discussed with patient the finding of the test preformed today. Patient has been diagnosed with abdominal pain, ovarian cyst, uterine fibroid and will be discharged home.  Patient requested to follow-up with primary care provider, OB/GYN within the next 7 days for reevaluation. Strict return precautions have been  given and patient verbalizes understanding        Final diagnoses:   Abdominal pain, unspecified abdominal location   Cyst of ovary, unspecified laterality   Uterine leiomyoma, unspecified location       Please note that portions of this document were completed using voice recognition dictation software.       Oscar Ivy, APRN  08/06/24 8123

## 2024-08-08 LAB — BACTERIA SPEC AEROBE CULT: NORMAL

## 2024-12-03 ENCOUNTER — OFFICE VISIT (OUTPATIENT)
Dept: OBSTETRICS AND GYNECOLOGY | Facility: CLINIC | Age: 49
End: 2024-12-03
Payer: COMMERCIAL

## 2024-12-03 VITALS — BODY MASS INDEX: 25.61 KG/M2 | SYSTOLIC BLOOD PRESSURE: 144 MMHG | WEIGHT: 140 LBS | DIASTOLIC BLOOD PRESSURE: 96 MMHG

## 2024-12-03 DIAGNOSIS — Z01.419 WOMEN'S ANNUAL ROUTINE GYNECOLOGICAL EXAMINATION: Primary | ICD-10-CM

## 2024-12-03 DIAGNOSIS — A60.9 HSV (HERPES SIMPLEX VIRUS) ANOGENITAL INFECTION: ICD-10-CM

## 2024-12-03 DIAGNOSIS — Z12.11 SCREENING FOR COLON CANCER: ICD-10-CM

## 2024-12-03 DIAGNOSIS — Z12.31 BREAST CANCER SCREENING BY MAMMOGRAM: ICD-10-CM

## 2024-12-03 DIAGNOSIS — Z87.42 HX OF ABNORMAL CERVICAL PAP SMEAR: ICD-10-CM

## 2024-12-03 DIAGNOSIS — Z80.3 FAMILY HISTORY OF BREAST CANCER: ICD-10-CM

## 2024-12-03 DIAGNOSIS — A60.00 GENITAL HERPES SIMPLEX, UNSPECIFIED SITE: ICD-10-CM

## 2024-12-03 RX ORDER — VALACYCLOVIR HYDROCHLORIDE 500 MG/1
TABLET, FILM COATED ORAL
Qty: 90 TABLET | Refills: 3 | Status: SHIPPED | OUTPATIENT
Start: 2024-12-03

## 2024-12-03 NOTE — PROGRESS NOTES
Gynecologic Annual Exam Note          GYN Annual Exam     Gynecologic Exam        Subjective     HPI  Yamel Don is a 48 y.o. female, , who presents for annual well woman exam as a established patient. There were no changes to her medical or surgical history since her last visit..  Patient's last menstrual period was 2024 (approximate).  Her periods occur every 28-30 days, lasting 4-5 days.  The flow is heavy for days 1-2 and lighter after that. She reports dysmenorrhea is moderate occurring premenstrually and first 1-2 days of flow. Marital Status: . She is sexually active. She has had new partners.. STD testing recommendations have been explained to the patient and she declines STD testing.    The patient would like to discuss the following complaints today: No complaints or concerns today.    Additional OB/GYN History   contraceptive methods: None  Desires to: do not start contraception  History of migraines: yes without aura    Last Pap : 22. Result: negative. HPV: not done. Her last HPV testing was  -  negative.  Last Completed Pap Smear            Ordered - PAP SMEAR (Every 3 Years) Ordered on 12/3/2024      2022  LIQUID-BASED PAP SMEAR, [facility] ([facility])    2021  SCANNED - PAP SMEAR                  History of abnormal Pap smear: yes - many years ago  Family history of uterine, colon, breast, or ovarian cancer: yes - mother - breast CA  Performs monthly Self-Breast Exam: no  Last mammogram: 24. Done at . There is a copy in the chart.    Last Completed Mammogram            Ordered - MAMMOGRAM (Every 2 Years) Ordered on 12/3/2024      2024  Mammo Screening Digital Tomosynthesis Bilateral With CAD                    Colonoscopy: has never had a colonoscopy or cologuard  Exercises Regularly: yes  Feelings of Anxiety or Depression: no  Tobacco Usage?: No but yes vaping nicotine      Current Outpatient Medications:     ibuprofen  (ADVIL,MOTRIN) 600 MG tablet, Take 1 tablet by mouth Every 6 (Six) Hours As Needed for Mild Pain ., Disp: 35 tablet, Rfl: 1    multivitamin (THERAGRAN) tablet tablet, Take  by mouth Daily., Disp: , Rfl:     Omega-3 Fatty Acids (OMEGA 3 PO), Take 1 capsule by mouth Daily., Disp: , Rfl:     Probiotic Product (PROBIOTIC DAILY PO), Take 1 capsule by mouth Daily., Disp: , Rfl:     SUMAtriptan (IMITREX) 100 MG tablet, Take 1 tablet by mouth Daily As Needed., Disp: , Rfl:     valACYclovir (VALTREX) 500 MG tablet, 1 po qd, Disp: 90 tablet, Rfl: 3     Patient is requesting refills of Valtrex.    OB History          3    Para   1    Term   1       0    AB   2    Living   1         SAB   1    IAB   0    Ectopic   0    Molar        Multiple   0    Live Births   1                Past Medical History:   Diagnosis Date    HPV (human papilloma virus) infection     Infertility, female     unexplained    Migraine     Recurrent pregnancy loss, currently pregnant     miscarrigae x2        Past Surgical History:   Procedure Laterality Date    ANKLE SURGERY Left     Achilles rupture repair    DILATATION AND CURETTAGE  2014    OTHER SURGICAL HISTORY  2017    Oocyte retrieval for IVF    WISDOM TOOTH EXTRACTION         Health Maintenance   Topic Date Due    BMI FOLLOWUP  Never done    COLORECTAL CANCER SCREENING  Never done    TDAP/TD VACCINES (1 - Tdap) Never done    HEPATITIS C SCREENING  Never done    ANNUAL PHYSICAL  Never done    INFLUENZA VACCINE  Never done    COVID-19 Vaccine ( - - season) 2024    Annual Gynecologic Pelvic and Breast Exam  2024    PAP SMEAR  2025    MAMMOGRAM  2026    Pneumococcal Vaccine 0-64  Aged Out       The additional following portions of the patient's history were reviewed and updated as appropriate: allergies, current medications, past family history, past medical history, past social history, past surgical history, and problem list.    Review of Systems    All other systems reviewed and are negative.        I have reviewed and agree with the HPI, ROS, and historical information as entered above. Radha Gamino, APRN          Objective   /96   Wt 63.5 kg (140 lb)   LMP 11/29/2024 (Approximate)   BMI 25.61 kg/m²     Physical Exam  Vitals and nursing note reviewed. Exam conducted with a chaperone present.   Constitutional:       General: She is not in acute distress.     Appearance: Normal appearance. She is well-developed and normal weight. She is not ill-appearing.   Neck:      Thyroid: No thyroid mass or thyromegaly.   Pulmonary:      Effort: Pulmonary effort is normal. No respiratory distress or retractions.   Chest:      Chest wall: No mass.   Breasts:     Right: Normal. No mass, nipple discharge, skin change or tenderness.      Left: Normal. No mass, nipple discharge, skin change or tenderness.   Abdominal:      General: There is no distension.      Palpations: Abdomen is soft. Abdomen is not rigid. There is no mass.      Tenderness: There is no abdominal tenderness. There is no guarding or rebound.      Hernia: No hernia is present. There is no hernia in the left inguinal area.   Genitourinary:     General: Normal vulva.      Labia:         Right: No rash, tenderness or lesion.         Left: No rash, tenderness or lesion.       Vagina: Normal. No vaginal discharge or lesions.      Cervix: Normal.      Uterus: Normal. Not enlarged, not fixed and not tender.       Adnexa: Right adnexa normal and left adnexa normal.        Right: No mass or tenderness.          Left: No mass or tenderness.        Rectum: No external hemorrhoid.   Musculoskeletal:      Cervical back: No muscular tenderness.   Skin:     General: Skin is warm and dry.   Neurological:      Mental Status: She is alert and oriented to person, place, and time.   Psychiatric:         Mood and Affect: Mood normal.         Behavior: Behavior normal.            Assessment and Plan    Problem List  Items Addressed This Visit          Family History    Family history of breast cancer    Overview     Diagnosed in patient's mother            Genitourinary and Reproductive     Hx of abnormal cervical Pap smear    Relevant Orders    LIQUID-BASED PAP SMEAR WITH HPV GENOTYPING REGARDLESS OF INTERPRETATION ([facility])       Other    HSV (herpes simplex virus) anogenital infection    Relevant Medications    valACYclovir (VALTREX) 500 MG tablet    Genital herpes simplex    Relevant Medications    valACYclovir (VALTREX) 500 MG tablet     Other Visit Diagnoses       Women's annual routine gynecological examination    -  Primary    Relevant Orders    LIQUID-BASED PAP SMEAR WITH HPV GENOTYPING REGARDLESS OF INTERPRETATION ([facility])    Breast cancer screening by mammogram        Relevant Orders    Mammo Screening Digital Tomosynthesis Bilateral With CAD    Screening for colon cancer        Relevant Orders    Ambulatory Referral For Screening Colonoscopy            GYN annual well woman exam.   Pap guidelines reviewed.  Encouraged use of condoms for STD prevention.  Reviewed monthly self breast exams.  Instructed to call with lumps, pain, or breast discharge.    Ordered Mammogram today  Recommended use of Vitamin D replacement and getting adequate calcium in her diet. (1500mg)  Reviewed exercise as a preventative health measures.   Smoking cessation encouraged.  Resources reviewed. (See above for full cessation details).  Reccommended Flu Vaccine in Fall of each year.  Symptoms of menopausal transition reviewed with patient.   RTC in 1 year or PRN with problems.  Discussed importance of routine colon cancer screening. Reviewed current guidelines. Recommended colonoscopy after age 45.  Return in about 1 year (around 12/3/2025) for Annual physical.     Radha Gamino, APRN  12/03/2024

## 2024-12-05 LAB — REF LAB TEST METHOD: NORMAL

## 2024-12-16 ENCOUNTER — TELEPHONE (OUTPATIENT)
Dept: OBSTETRICS AND GYNECOLOGY | Facility: CLINIC | Age: 49
End: 2024-12-16
Payer: COMMERCIAL

## 2024-12-16 NOTE — TELEPHONE ENCOUNTER
Patient called back about pap smear results. Patient scheduled for colpo with Dr Nunez 1/16/24. Patient states she was at the tail end of her period when her pap was done and she denies any AUB. She will hold off on U/S ( see Radha's lab result note) unless she develops any AUB she will call back.      -JENNIFER Skaggs

## 2025-01-16 ENCOUNTER — OFFICE VISIT (OUTPATIENT)
Dept: OBSTETRICS AND GYNECOLOGY | Facility: CLINIC | Age: 50
End: 2025-01-16
Payer: COMMERCIAL

## 2025-01-16 VITALS — BODY MASS INDEX: 26.13 KG/M2 | WEIGHT: 142 LBS | HEIGHT: 62 IN

## 2025-01-16 DIAGNOSIS — R87.810 CERVICAL HIGH RISK HPV (HUMAN PAPILLOMAVIRUS) TEST POSITIVE: ICD-10-CM

## 2025-01-16 DIAGNOSIS — Z87.42 HX OF ABNORMAL CERVICAL PAP SMEAR: Primary | ICD-10-CM

## 2025-01-16 DIAGNOSIS — R87.811 VAGINAL HIGH RISK HPV DNA TEST POSITIVE: ICD-10-CM

## 2025-01-16 LAB
B-HCG UR QL: NEGATIVE
EXPIRATION DATE: NORMAL
INTERNAL NEGATIVE CONTROL: NORMAL
INTERNAL POSITIVE CONTROL: NORMAL
Lab: NORMAL

## 2025-01-16 RX ORDER — SODIUM, POTASSIUM,MAG SULFATES 17.5-3.13G
SOLUTION, RECONSTITUTED, ORAL ORAL
Qty: 354 ML | Refills: 0 | Status: SHIPPED | OUTPATIENT
Start: 2025-01-16

## 2025-01-16 NOTE — PROGRESS NOTES
Colposcopy Procedure Note        Procedures    Indications: Yamel Don is a 49 y.o. female, , whose Patient's last menstrual period was 2024 (approximate)..  She presents for follow up for evaluation of an abnormal PAP smear that showed  neg pap but HPV 18/45+ and endometrial cells present on 12/3/2024 . Prior cervical treatment includes: prior colposcopy. She understands the need for the procedure and is aware of the complications, including post-colposcopic vaginal bleeding, vaginal leukorrhea or cervicitis.  She is aware she may experience discomfort.  After being presented with the risk, benefits, and alternatives the patient wished to proceed.      Urine pregnancy test done in the office today was Negative.      The patient has not had Gardasil.  Pt states she is vaping sometimes.       Procedure Details   The risks and benefits of the procedure and Verbal informed consent obtained. Time out: immediate members of the procedure team and patient agree to the following: correct patient, correct site, correct procedure to be performed. Vandana Nunez MD      She was positioned in the dorsal lithotomy position and a speculum was inserted into the vagina and excellent visualization of cervix achieved, cervix swabbed x 3 with acetic acid solution. The transformation zone was completely visualized.  A cervical biopsy was obtained at 1 o'clock.  An endocervical curettage was performed.  This colposcopy was satisfactory.     Findings:  The procedure was notable for:  Physical Exam  Vitals and nursing note reviewed. Exam conducted with a chaperone present.   Constitutional:       Appearance: Normal appearance. She is well-developed.   HENT:      Head: Normocephalic and atraumatic.   Pulmonary:      Effort: Pulmonary effort is normal.   Abdominal:      General: There is no distension.      Palpations: Abdomen is soft. Abdomen is not rigid. There is no mass.      Tenderness: There is no  "abdominal tenderness. There is no guarding or rebound.   Genitourinary:     Exam position: Lithotomy position.      Comments: Cervix: acetowhite lesion(s) noted at 1 o'clock; .  Skin:     General: Skin is warm and dry.   Neurological:      Mental Status: She is alert and oriented to person, place, and time.   Psychiatric:         Mood and Affect: Mood normal.         Behavior: Behavior normal.       Ht 157.5 cm (62\")   Wt 64.4 kg (142 lb)   LMP 12/27/2024 (Approximate)   BMI 25.97 kg/m²       Specimens: ecc, cervical biopsy  Specimens labelled and sent to Pathology.    Complications: none.    The patient tolerated the procedure well and with mild discomfort and bleeding.    Assessment and Plan    Problem List Items Addressed This Visit          Genitourinary and Reproductive     Cervical high risk HPV (human papillomavirus) test positive - Primary    Overview     12/3/2024 neg HPV 18/45+  1/16/2025- colpo            Will base further treatment on Pathology findings.  Treatment options discussed with patient.  Post biopsy instructions given to patient.  Repeat Pap in 12 months.    Vandana Nunez MD  01/16/2025       "

## 2025-01-17 LAB — REF LAB TEST METHOD: NORMAL

## 2025-01-22 ENCOUNTER — TELEPHONE (OUTPATIENT)
Dept: GASTROENTEROLOGY | Facility: CLINIC | Age: 50
End: 2025-01-22
Payer: COMMERCIAL